# Patient Record
Sex: FEMALE
[De-identification: names, ages, dates, MRNs, and addresses within clinical notes are randomized per-mention and may not be internally consistent; named-entity substitution may affect disease eponyms.]

---

## 2017-01-11 ENCOUNTER — RX RENEWAL (OUTPATIENT)
Age: 32
End: 2017-01-11

## 2017-01-11 DIAGNOSIS — Z87.09 PERSONAL HISTORY OF OTHER DISEASES OF THE RESPIRATORY SYSTEM: ICD-10-CM

## 2017-01-11 RX ORDER — AMOXICILLIN AND CLAVULANATE POTASSIUM 875; 125 MG/1; MG/1
875-125 TABLET, COATED ORAL TWICE DAILY
Qty: 20 | Refills: 0 | Status: DISCONTINUED | COMMUNITY
Start: 2017-01-11 | End: 2017-01-11

## 2017-08-15 ENCOUNTER — INPATIENT (INPATIENT)
Facility: HOSPITAL | Age: 32
LOS: 2 days | Discharge: HOME | End: 2017-08-18
Attending: OBSTETRICS & GYNECOLOGY | Admitting: OBSTETRICS & GYNECOLOGY

## 2017-08-15 VITALS — HEIGHT: 63 IN | WEIGHT: 257.94 LBS

## 2017-08-15 DIAGNOSIS — O26.899 OTHER SPECIFIED PREGNANCY RELATED CONDITIONS, UNSPECIFIED TRIMESTER: ICD-10-CM

## 2017-08-15 DIAGNOSIS — Z3A.00 WEEKS OF GESTATION OF PREGNANCY NOT SPECIFIED: ICD-10-CM

## 2017-08-15 LAB
BASOPHILS # BLD AUTO: 0.03 K/UL — SIGNIFICANT CHANGE UP (ref 0–0.2)
BASOPHILS NFR BLD AUTO: 0.3 % — SIGNIFICANT CHANGE UP (ref 0–2)
BLD GP AB SCN SERPL QL: NEGATIVE — SIGNIFICANT CHANGE UP
EOSINOPHIL # BLD AUTO: 0.07 K/UL — SIGNIFICANT CHANGE UP (ref 0–0.5)
EOSINOPHIL NFR BLD AUTO: 0.6 % — SIGNIFICANT CHANGE UP (ref 0–6)
HCT VFR BLD CALC: 35.2 % — SIGNIFICANT CHANGE UP (ref 34.5–45)
HGB BLD-MCNC: 11.3 G/DL — LOW (ref 11.5–15.5)
IMM GRANULOCYTES # BLD AUTO: 0.05 # — SIGNIFICANT CHANGE UP
IMM GRANULOCYTES NFR BLD AUTO: 0.4 % — SIGNIFICANT CHANGE UP (ref 0–1.5)
LYMPHOCYTES # BLD AUTO: 28.3 % — SIGNIFICANT CHANGE UP (ref 13–44)
LYMPHOCYTES # BLD AUTO: 3.38 K/UL — HIGH (ref 1–3.3)
MCHC RBC-ENTMCNC: 28.3 PG — SIGNIFICANT CHANGE UP (ref 27–34)
MCHC RBC-ENTMCNC: 32.1 % — SIGNIFICANT CHANGE UP (ref 32–36)
MCV RBC AUTO: 88.2 FL — SIGNIFICANT CHANGE UP (ref 80–100)
MONOCYTES # BLD AUTO: 0.74 K/UL — SIGNIFICANT CHANGE UP (ref 0–0.9)
MONOCYTES NFR BLD AUTO: 6.2 % — SIGNIFICANT CHANGE UP (ref 2–14)
NEUTROPHILS # BLD AUTO: 7.69 K/UL — HIGH (ref 1.8–7.4)
NEUTROPHILS NFR BLD AUTO: 64.2 % — SIGNIFICANT CHANGE UP (ref 43–77)
NRBC # FLD: 0 — SIGNIFICANT CHANGE UP
PLATELET # BLD AUTO: 185 K/UL — SIGNIFICANT CHANGE UP (ref 150–400)
PMV BLD: 11.9 FL — SIGNIFICANT CHANGE UP (ref 7–13)
RBC # BLD: 3.99 M/UL — SIGNIFICANT CHANGE UP (ref 3.8–5.2)
RBC # FLD: 14.2 % — SIGNIFICANT CHANGE UP (ref 10.3–14.5)
RH IG SCN BLD-IMP: POSITIVE — SIGNIFICANT CHANGE UP
RH IG SCN BLD-IMP: POSITIVE — SIGNIFICANT CHANGE UP
WBC # BLD: 11.96 K/UL — HIGH (ref 3.8–10.5)
WBC # FLD AUTO: 11.96 K/UL — HIGH (ref 3.8–10.5)

## 2017-08-15 RX ORDER — OXYTOCIN 10 UNIT/ML
333.33 VIAL (ML) INJECTION
Qty: 20 | Refills: 0 | Status: DISCONTINUED | OUTPATIENT
Start: 2017-08-15 | End: 2017-08-15

## 2017-08-15 RX ORDER — SODIUM CHLORIDE 9 MG/ML
500 INJECTION, SOLUTION INTRAVENOUS ONCE
Refills: 0 | Status: DISCONTINUED | OUTPATIENT
Start: 2017-08-15 | End: 2017-08-15

## 2017-08-15 RX ORDER — SODIUM CHLORIDE 9 MG/ML
1000 INJECTION, SOLUTION INTRAVENOUS
Refills: 0 | Status: DISCONTINUED | OUTPATIENT
Start: 2017-08-15 | End: 2017-08-15

## 2017-08-15 RX ORDER — ACETAMINOPHEN 500 MG
325 TABLET ORAL ONCE
Refills: 0 | Status: COMPLETED | OUTPATIENT
Start: 2017-08-15 | End: 2017-08-15

## 2017-08-15 RX ORDER — SODIUM CHLORIDE 9 MG/ML
500 INJECTION, SOLUTION INTRAVENOUS ONCE
Refills: 0 | Status: DISCONTINUED | OUTPATIENT
Start: 2017-08-15 | End: 2017-08-16

## 2017-08-15 RX ORDER — OXYTOCIN 10 UNIT/ML
333.33 VIAL (ML) INJECTION
Qty: 20 | Refills: 0 | Status: DISCONTINUED | OUTPATIENT
Start: 2017-08-15 | End: 2017-08-16

## 2017-08-15 RX ORDER — CITRIC ACID/SODIUM CITRATE 300-500 MG
15 SOLUTION, ORAL ORAL EVERY 4 HOURS
Refills: 0 | Status: DISCONTINUED | OUTPATIENT
Start: 2017-08-15 | End: 2017-08-16

## 2017-08-15 RX ORDER — CITRIC ACID/SODIUM CITRATE 300-500 MG
15 SOLUTION, ORAL ORAL EVERY 4 HOURS
Refills: 0 | Status: DISCONTINUED | OUTPATIENT
Start: 2017-08-15 | End: 2017-08-15

## 2017-08-15 RX ORDER — SODIUM CHLORIDE 9 MG/ML
1000 INJECTION, SOLUTION INTRAVENOUS
Refills: 0 | Status: DISCONTINUED | OUTPATIENT
Start: 2017-08-15 | End: 2017-08-16

## 2017-08-15 RX ORDER — CITRIC ACID/SODIUM CITRATE 300-500 MG
30 SOLUTION, ORAL ORAL ONCE
Refills: 0 | Status: COMPLETED | OUTPATIENT
Start: 2017-08-15 | End: 2017-08-15

## 2017-08-15 RX ADMIN — Medication 30 MILLILITER(S): at 20:31

## 2017-08-15 RX ADMIN — Medication 325 MILLIGRAM(S): at 20:48

## 2017-08-15 RX ADMIN — SODIUM CHLORIDE 125 MILLILITER(S): 9 INJECTION, SOLUTION INTRAVENOUS at 20:33

## 2017-08-16 ENCOUNTER — TRANSCRIPTION ENCOUNTER (OUTPATIENT)
Age: 32
End: 2017-08-16

## 2017-08-16 LAB — T PALLIDUM AB TITR SER: NEGATIVE — SIGNIFICANT CHANGE UP

## 2017-08-16 RX ORDER — HYDROCORTISONE 1 %
1 OINTMENT (GRAM) TOPICAL EVERY 4 HOURS
Refills: 0 | Status: DISCONTINUED | OUTPATIENT
Start: 2017-08-16 | End: 2017-08-16

## 2017-08-16 RX ORDER — KETOROLAC TROMETHAMINE 30 MG/ML
30 SYRINGE (ML) INJECTION ONCE
Refills: 0 | Status: DISCONTINUED | OUTPATIENT
Start: 2017-08-16 | End: 2017-08-16

## 2017-08-16 RX ORDER — DIPHENHYDRAMINE HCL 50 MG
25 CAPSULE ORAL EVERY 6 HOURS
Refills: 0 | Status: DISCONTINUED | OUTPATIENT
Start: 2017-08-16 | End: 2017-08-16

## 2017-08-16 RX ORDER — SIMETHICONE 80 MG/1
80 TABLET, CHEWABLE ORAL EVERY 6 HOURS
Refills: 0 | Status: DISCONTINUED | OUTPATIENT
Start: 2017-08-16 | End: 2017-08-18

## 2017-08-16 RX ORDER — DIPHENHYDRAMINE HCL 50 MG
25 CAPSULE ORAL EVERY 6 HOURS
Refills: 0 | Status: DISCONTINUED | OUTPATIENT
Start: 2017-08-16 | End: 2017-08-18

## 2017-08-16 RX ORDER — ACETAMINOPHEN 500 MG
975 TABLET ORAL EVERY 6 HOURS
Refills: 0 | Status: DISCONTINUED | OUTPATIENT
Start: 2017-08-16 | End: 2017-08-18

## 2017-08-16 RX ORDER — MAGNESIUM HYDROXIDE 400 MG/1
30 TABLET, CHEWABLE ORAL
Refills: 0 | Status: DISCONTINUED | OUTPATIENT
Start: 2017-08-16 | End: 2017-08-18

## 2017-08-16 RX ORDER — SODIUM CHLORIDE 9 MG/ML
3 INJECTION INTRAMUSCULAR; INTRAVENOUS; SUBCUTANEOUS EVERY 8 HOURS
Refills: 0 | Status: DISCONTINUED | OUTPATIENT
Start: 2017-08-16 | End: 2017-08-18

## 2017-08-16 RX ORDER — SODIUM CHLORIDE 9 MG/ML
3 INJECTION INTRAMUSCULAR; INTRAVENOUS; SUBCUTANEOUS EVERY 8 HOURS
Refills: 0 | Status: DISCONTINUED | OUTPATIENT
Start: 2017-08-16 | End: 2017-08-16

## 2017-08-16 RX ORDER — DOCUSATE SODIUM 100 MG
100 CAPSULE ORAL
Refills: 0 | Status: DISCONTINUED | OUTPATIENT
Start: 2017-08-16 | End: 2017-08-16

## 2017-08-16 RX ORDER — OXYCODONE HYDROCHLORIDE 5 MG/1
5 TABLET ORAL
Refills: 0 | Status: DISCONTINUED | OUTPATIENT
Start: 2017-08-16 | End: 2017-08-18

## 2017-08-16 RX ORDER — PRAMOXINE HYDROCHLORIDE 150 MG/15G
1 AEROSOL, FOAM RECTAL EVERY 4 HOURS
Refills: 0 | Status: DISCONTINUED | OUTPATIENT
Start: 2017-08-16 | End: 2017-08-16

## 2017-08-16 RX ORDER — ACETAMINOPHEN 500 MG
975 TABLET ORAL EVERY 6 HOURS
Refills: 0 | Status: COMPLETED | OUTPATIENT
Start: 2017-08-16 | End: 2018-07-15

## 2017-08-16 RX ORDER — GLYCERIN ADULT
1 SUPPOSITORY, RECTAL RECTAL AT BEDTIME
Refills: 0 | Status: DISCONTINUED | OUTPATIENT
Start: 2017-08-16 | End: 2017-08-16

## 2017-08-16 RX ORDER — OXYTOCIN 10 UNIT/ML
2 VIAL (ML) INJECTION
Qty: 30 | Refills: 0 | Status: DISCONTINUED | OUTPATIENT
Start: 2017-08-16 | End: 2017-08-16

## 2017-08-16 RX ORDER — SODIUM CHLORIDE 9 MG/ML
1000 INJECTION, SOLUTION INTRAVENOUS
Refills: 0 | Status: DISCONTINUED | OUTPATIENT
Start: 2017-08-16 | End: 2017-08-16

## 2017-08-16 RX ORDER — TETANUS TOXOID, REDUCED DIPHTHERIA TOXOID AND ACELLULAR PERTUSSIS VACCINE, ADSORBED 5; 2.5; 8; 8; 2.5 [IU]/.5ML; [IU]/.5ML; UG/.5ML; UG/.5ML; UG/.5ML
0.5 SUSPENSION INTRAMUSCULAR ONCE
Refills: 0 | Status: DISCONTINUED | OUTPATIENT
Start: 2017-08-16 | End: 2017-08-18

## 2017-08-16 RX ORDER — OXYTOCIN 10 UNIT/ML
333.33 VIAL (ML) INJECTION
Qty: 20 | Refills: 0 | Status: COMPLETED | OUTPATIENT
Start: 2017-08-16 | End: 2017-08-16

## 2017-08-16 RX ORDER — PRAMOXINE HYDROCHLORIDE 150 MG/15G
1 AEROSOL, FOAM RECTAL EVERY 4 HOURS
Refills: 0 | Status: DISCONTINUED | OUTPATIENT
Start: 2017-08-16 | End: 2017-08-18

## 2017-08-16 RX ORDER — HYDROCORTISONE 1 %
1 OINTMENT (GRAM) TOPICAL EVERY 4 HOURS
Refills: 0 | Status: DISCONTINUED | OUTPATIENT
Start: 2017-08-16 | End: 2017-08-18

## 2017-08-16 RX ORDER — IBUPROFEN 200 MG
600 TABLET ORAL EVERY 6 HOURS
Refills: 0 | Status: DISCONTINUED | OUTPATIENT
Start: 2017-08-16 | End: 2017-08-18

## 2017-08-16 RX ORDER — CITRIC ACID/SODIUM CITRATE 300-500 MG
15 SOLUTION, ORAL ORAL EVERY 4 HOURS
Refills: 0 | Status: DISCONTINUED | OUTPATIENT
Start: 2017-08-16 | End: 2017-08-16

## 2017-08-16 RX ORDER — DOCUSATE SODIUM 100 MG
100 CAPSULE ORAL
Refills: 0 | Status: DISCONTINUED | OUTPATIENT
Start: 2017-08-16 | End: 2017-08-18

## 2017-08-16 RX ORDER — OXYCODONE HYDROCHLORIDE 5 MG/1
5 TABLET ORAL EVERY 4 HOURS
Refills: 0 | Status: DISCONTINUED | OUTPATIENT
Start: 2017-08-16 | End: 2017-08-18

## 2017-08-16 RX ORDER — LANOLIN
1 OINTMENT (GRAM) TOPICAL EVERY 6 HOURS
Refills: 0 | Status: DISCONTINUED | OUTPATIENT
Start: 2017-08-16 | End: 2017-08-18

## 2017-08-16 RX ORDER — IBUPROFEN 200 MG
600 TABLET ORAL EVERY 6 HOURS
Refills: 0 | Status: COMPLETED | OUTPATIENT
Start: 2017-08-16 | End: 2018-07-15

## 2017-08-16 RX ORDER — DIBUCAINE 1 %
1 OINTMENT (GRAM) RECTAL EVERY 4 HOURS
Refills: 0 | Status: DISCONTINUED | OUTPATIENT
Start: 2017-08-16 | End: 2017-08-18

## 2017-08-16 RX ORDER — OXYTOCIN 10 UNIT/ML
41.67 VIAL (ML) INJECTION
Qty: 20 | Refills: 0 | Status: DISCONTINUED | OUTPATIENT
Start: 2017-08-16 | End: 2017-08-16

## 2017-08-16 RX ORDER — AER TRAVELER 0.5 G/1
1 SOLUTION RECTAL; TOPICAL EVERY 4 HOURS
Refills: 0 | Status: DISCONTINUED | OUTPATIENT
Start: 2017-08-16 | End: 2017-08-16

## 2017-08-16 RX ORDER — SIMETHICONE 80 MG/1
80 TABLET, CHEWABLE ORAL EVERY 6 HOURS
Refills: 0 | Status: DISCONTINUED | OUTPATIENT
Start: 2017-08-16 | End: 2017-08-16

## 2017-08-16 RX ORDER — SODIUM CHLORIDE 9 MG/ML
500 INJECTION, SOLUTION INTRAVENOUS ONCE
Refills: 0 | Status: DISCONTINUED | OUTPATIENT
Start: 2017-08-16 | End: 2017-08-16

## 2017-08-16 RX ORDER — DIBUCAINE 1 %
1 OINTMENT (GRAM) RECTAL EVERY 4 HOURS
Refills: 0 | Status: DISCONTINUED | OUTPATIENT
Start: 2017-08-16 | End: 2017-08-16

## 2017-08-16 RX ORDER — OXYTOCIN 10 UNIT/ML
333.33 VIAL (ML) INJECTION
Qty: 20 | Refills: 0 | Status: COMPLETED | OUTPATIENT
Start: 2017-08-16

## 2017-08-16 RX ORDER — GLYCERIN ADULT
1 SUPPOSITORY, RECTAL RECTAL AT BEDTIME
Refills: 0 | Status: DISCONTINUED | OUTPATIENT
Start: 2017-08-16 | End: 2017-08-18

## 2017-08-16 RX ORDER — LANOLIN
1 OINTMENT (GRAM) TOPICAL EVERY 6 HOURS
Refills: 0 | Status: DISCONTINUED | OUTPATIENT
Start: 2017-08-16 | End: 2017-08-16

## 2017-08-16 RX ORDER — MAGNESIUM HYDROXIDE 400 MG/1
30 TABLET, CHEWABLE ORAL
Refills: 0 | Status: DISCONTINUED | OUTPATIENT
Start: 2017-08-16 | End: 2017-08-16

## 2017-08-16 RX ORDER — AER TRAVELER 0.5 G/1
1 SOLUTION RECTAL; TOPICAL EVERY 4 HOURS
Refills: 0 | Status: DISCONTINUED | OUTPATIENT
Start: 2017-08-16 | End: 2017-08-18

## 2017-08-16 RX ORDER — TETANUS TOXOID, REDUCED DIPHTHERIA TOXOID AND ACELLULAR PERTUSSIS VACCINE, ADSORBED 5; 2.5; 8; 8; 2.5 [IU]/.5ML; [IU]/.5ML; UG/.5ML; UG/.5ML; UG/.5ML
0.5 SUSPENSION INTRAMUSCULAR ONCE
Refills: 0 | Status: DISCONTINUED | OUTPATIENT
Start: 2017-08-16 | End: 2017-08-16

## 2017-08-16 RX ADMIN — Medication 30 MILLIGRAM(S): at 14:49

## 2017-08-16 RX ADMIN — Medication 975 MILLIGRAM(S): at 23:30

## 2017-08-16 RX ADMIN — Medication 600 MILLIGRAM(S): at 22:52

## 2017-08-16 RX ADMIN — Medication 1000 MILLIUNIT(S)/MIN: at 13:32

## 2017-08-16 RX ADMIN — Medication 975 MILLIGRAM(S): at 16:30

## 2017-08-16 RX ADMIN — Medication 2 MILLIUNIT(S)/MIN: at 08:44

## 2017-08-16 RX ADMIN — Medication 975 MILLIGRAM(S): at 17:15

## 2017-08-16 RX ADMIN — Medication 975 MILLIGRAM(S): at 22:53

## 2017-08-16 RX ADMIN — Medication 30 MILLIGRAM(S): at 13:45

## 2017-08-16 RX ADMIN — OXYCODONE HYDROCHLORIDE 5 MILLIGRAM(S): 5 TABLET ORAL at 22:52

## 2017-08-16 NOTE — DISCHARGE NOTE OB - CARE PROVIDER_API CALL
Gilberto Mercedes), Obstetrics and Gynecology  [address] [place] [address]  Phone: [phone]  Fax: [phone]

## 2017-08-16 NOTE — DISCHARGE NOTE OB - PATIENT PORTAL LINK FT
“You can access the [facility] Patient Portal, offered by [facility], by registering with the following website: [email or web address]”

## 2017-08-16 NOTE — DISCHARGE NOTE OB - MEDICATION SUMMARY - MEDICATIONS TO TAKE
I will START or STAY ON the medications listed below when I get home from the hospital:  None I will START or STAY ON the medications listed below when I get home from the hospital:    ibuprofen 600 mg oral tablet  -- 1 tab(s) by mouth every 6 hours, As Needed  -- Indication: For Pain    acetaminophen 325 mg oral tablet  -- 3 tab(s) by mouth every 6 hours, As Needed  -- Indication: For Pain

## 2017-08-17 DIAGNOSIS — O26.899 OTHER SPECIFIED PREGNANCY RELATED CONDITIONS, UNSPECIFIED TRIMESTER: ICD-10-CM

## 2017-08-17 DIAGNOSIS — O48.0 POST-TERM PREGNANCY: ICD-10-CM

## 2017-08-17 RX ORDER — ACETAMINOPHEN 500 MG
3 TABLET ORAL
Qty: 0 | Refills: 0 | DISCHARGE
Start: 2017-08-17

## 2017-08-17 RX ORDER — IBUPROFEN 200 MG
1 TABLET ORAL
Qty: 0 | Refills: 0 | DISCHARGE
Start: 2017-08-17

## 2017-08-17 RX ADMIN — OXYCODONE HYDROCHLORIDE 5 MILLIGRAM(S): 5 TABLET ORAL at 11:30

## 2017-08-17 RX ADMIN — OXYCODONE HYDROCHLORIDE 5 MILLIGRAM(S): 5 TABLET ORAL at 05:00

## 2017-08-17 RX ADMIN — Medication 975 MILLIGRAM(S): at 10:42

## 2017-08-17 RX ADMIN — OXYCODONE HYDROCHLORIDE 5 MILLIGRAM(S): 5 TABLET ORAL at 10:43

## 2017-08-17 RX ADMIN — OXYCODONE HYDROCHLORIDE 5 MILLIGRAM(S): 5 TABLET ORAL at 19:30

## 2017-08-17 RX ADMIN — Medication 975 MILLIGRAM(S): at 07:00

## 2017-08-17 RX ADMIN — Medication 100 MILLIGRAM(S): at 18:35

## 2017-08-17 RX ADMIN — Medication 600 MILLIGRAM(S): at 17:13

## 2017-08-17 RX ADMIN — PRAMOXINE HYDROCHLORIDE 1 APPLICATION(S): 150 AEROSOL, FOAM RECTAL at 20:48

## 2017-08-17 RX ADMIN — Medication 1 TABLET(S): at 10:43

## 2017-08-17 RX ADMIN — OXYCODONE HYDROCHLORIDE 5 MILLIGRAM(S): 5 TABLET ORAL at 12:06

## 2017-08-17 RX ADMIN — OXYCODONE HYDROCHLORIDE 5 MILLIGRAM(S): 5 TABLET ORAL at 18:00

## 2017-08-17 RX ADMIN — Medication 1 APPLICATION(S): at 20:48

## 2017-08-17 RX ADMIN — Medication 600 MILLIGRAM(S): at 10:42

## 2017-08-17 RX ADMIN — Medication 600 MILLIGRAM(S): at 11:30

## 2017-08-17 RX ADMIN — OXYCODONE HYDROCHLORIDE 5 MILLIGRAM(S): 5 TABLET ORAL at 23:13

## 2017-08-17 RX ADMIN — OXYCODONE HYDROCHLORIDE 5 MILLIGRAM(S): 5 TABLET ORAL at 17:14

## 2017-08-17 RX ADMIN — OXYCODONE HYDROCHLORIDE 5 MILLIGRAM(S): 5 TABLET ORAL at 13:00

## 2017-08-17 RX ADMIN — Medication 600 MILLIGRAM(S): at 18:00

## 2017-08-17 RX ADMIN — Medication 975 MILLIGRAM(S): at 11:30

## 2017-08-17 RX ADMIN — Medication 975 MILLIGRAM(S): at 05:00

## 2017-08-17 RX ADMIN — Medication 600 MILLIGRAM(S): at 07:00

## 2017-08-17 RX ADMIN — Medication 975 MILLIGRAM(S): at 23:12

## 2017-08-17 RX ADMIN — AER TRAVELER 1 APPLICATION(S): 0.5 SOLUTION RECTAL; TOPICAL at 20:48

## 2017-08-17 RX ADMIN — Medication 600 MILLIGRAM(S): at 23:12

## 2017-08-17 RX ADMIN — OXYCODONE HYDROCHLORIDE 5 MILLIGRAM(S): 5 TABLET ORAL at 07:00

## 2017-08-17 RX ADMIN — Medication 600 MILLIGRAM(S): at 05:00

## 2017-08-17 RX ADMIN — Medication 975 MILLIGRAM(S): at 18:00

## 2017-08-17 RX ADMIN — OXYCODONE HYDROCHLORIDE 5 MILLIGRAM(S): 5 TABLET ORAL at 18:35

## 2017-08-17 RX ADMIN — Medication 975 MILLIGRAM(S): at 17:13

## 2017-08-17 NOTE — PROGRESS NOTE ADULT - SUBJECTIVE AND OBJECTIVE BOX
Anesthesia Post-op Note    POD#1 S/P     Patient is doing well.  OOBAA.  Pain is tolerable.  No complaints of Headache. Pt reports able to void.  No residual anesthetic issues or complications noted.    T(C): 36.8 (17 @ 17:50), Max: 36.8 (17 @ 22:22)  HR: 96 (17 @ 17:50) (85 - 98)  BP: 129/61 (17 @ 17:50) (128/66 - 133/55)  RR: 17 (17 @ 17:50) (17 - 18)  SpO2: 100% (17 @ 17:50) (98% - 100%)

## 2017-08-17 NOTE — PROGRESS NOTE ADULT - SUBJECTIVE AND OBJECTIVE BOX
S: Patient pain in tail bone and at epidural site    Pain controlled.  +OOB.  +void.    Tolerating PO.  Lochia WNL.    O: Vital Signs Last 24 Hrs  T(C): 36.7 (17 Aug 2017 06:00), Max: 37.4 (16 Aug 2017 12:59)  T(F): 98 (17 Aug 2017 06:00), Max: 99.3 (16 Aug 2017 12:59)  HR: 98 (17 Aug 2017 06:00) (68 - 116)  BP: 129/67 (17 Aug 2017 06:00) (123/69 - 147/73)  BP(mean): --  RR: 18 (17 Aug 2017 06:00) (16 - 18)  SpO2: 98% (17 Aug 2017 06:00) (98% - 99%)      Pt without complaints  vital signs stable  Abdomen soft  fundus firm, non tender  extremities non tender  lochia wnl    Patient doing well  Routine post partum care    Labs:                        11.3   11.96 )-----------( 185      ( 15 Aug 2017 20:10 )             35.2       ABO Interpretation: O (08-15 @ 20:33)    Rh Interpretation: Positive (08-15 @ 20:33)    Antibody Screen Negative      A: 32y s/p  doing well.   -Continue  postpartum care.  to have anesthesia see patient  consider physical therapy consult  -Discharge planned for tomorrow

## 2017-08-17 NOTE — PROGRESS NOTE ADULT - SUBJECTIVE AND OBJECTIVE BOX
Patient assessed at 0745.  Subjective  Pain: Patient reports pain in coccyx area that has slightly relieved by pain medication protocol but still painful.  Complaints: pain in coccyx area since delivery.   Milestones: Alert and orientedx3. Out of bed ambulating. Voiding/Due to void. Tolerating a regular diet.  Infant feeding: Formula feeding. Patient instructed to wear tight bra and ice to breast 2-3times per day.                               Feeding related issues or concerns: None    Objective   Vital Signs:  Vital Signs Last 24 Hrs  T(C): 36.7 (17 Aug 2017 06:00), Max: 37.4 (16 Aug 2017 12:59)  T(F): 98 (17 Aug 2017 06:00), Max: 99.3 (16 Aug 2017 12:59)  HR: 98 (17 Aug 2017 06:00) (68 - 116)  BP: 129/67 (17 Aug 2017 06:00) (123/69 - 147/73)  BP(mean): --  RR: 18 (17 Aug 2017 06:00) (16 - 18)  SpO2: 98% (17 Aug 2017 06:00) (98% - 99%)    Labs:                        11.3   11.96 )-----------( 185      ( 15 Aug 2017 20:10 )             35.2   Blood Type:Opositive  Rubella: Immune  RPR: nonreactive    Assessment  31y/o . Day #1  postpartum .  Past medical history significant for traumatic brain injury 15 year ago and headaches associated due to this injury. Patient denies any headache, blur vision and/or dizziness at the time of assessment.   Current Issues: coccyx pain. Patient denies any groin pain. Patient reports ability to ambulate slowly and weight bare on legs.   Breasts: soft, nontender  Nipples: intact  Abdomen: Soft, nontender, nondistended. Fundus firm. Positive bowel sounds  Vagina: Lochia moderate rubra  Perineum: Patient unable to side lie to fully assess perineum. Perineum assessed in supine position and 2 degree laceration WNL. No edema noted  Laceration/episiotomy site: 2nd degree laceration.  Lower extremities: No edema noted bilaterally of lower extremities. Nontender calves.  Negative Mesha's sign. Positive pedal pulses.  Other relevant physical exam findings;      Plan  Plan: Increase ambulation, analgesia PRN and pain medication protocol standing oxycodone, ibuprofen and acetaminophen.  Diet: Continue regular diet  Labs: None  Patient instructed to continue taking pain medications as ordered, abdominal binder and hot pack to coccyx to assist with coccyx pain. Will continue to monitor.     Continue routine postpartum care. Patient assessed at 0745.  Subjective  Pain: Patient reports pain in sacrum/coccyx area that has slightly relieved by pain medication protocol but still painful.  Complaints: pain in coccyx area since delivery.   Milestones: Alert and orientedx3. Out of bed ambulating. Voiding/Due to void. Tolerating a regular diet.  Infant feeding: Formula feeding. Patient instructed to wear tight bra and ice to breast 2-3times per day.                               Feeding related issues or concerns: None    Objective   Vital Signs:  Vital Signs Last 24 Hrs  T(C): 36.7 (17 Aug 2017 06:00), Max: 37.4 (16 Aug 2017 12:59)  T(F): 98 (17 Aug 2017 06:00), Max: 99.3 (16 Aug 2017 12:59)  HR: 98 (17 Aug 2017 06:00) (68 - 116)  BP: 129/67 (17 Aug 2017 06:00) (123/69 - 147/73)  BP(mean): --  RR: 18 (17 Aug 2017 06:00) (16 - 18)  SpO2: 98% (17 Aug 2017 06:00) (98% - 99%)    Labs:                        11.3   11.96 )-----------( 185      ( 15 Aug 2017 20:10 )             35.2   Blood Type:Opositive  Rubella: Immune  RPR: nonreactive    Assessment  31y/o . Day #1  postpartum .  Past medical history significant for traumatic brain injury 15 year ago and headaches associated due to this injury. Patient denies any headache, blur vision and/or dizziness at the time of assessment.   Current Issues: coccyx pain. Patient denies any groin pain. Patient reports ability to ambulate slowly and weight bare on legs.   Breasts: soft, nontender  Nipples: intact  Abdomen: Soft, nontender, nondistended. Fundus firm. Positive bowel sounds  Vagina: Lochia moderate rubra  Perineum: Patient unable to side lie to fully assess perineum. Perineum assessed in supine position and 2 degree laceration WNL. No edema noted  Laceration/episiotomy site: 2nd degree laceration.  Lower extremities: No edema noted bilaterally of lower extremities. Nontender calves.  Negative Mesha's sign. Positive pedal pulses.  Other relevant physical exam findings;      Plan  Plan: Increase ambulation, analgesia PRN and pain medication protocol standing oxycodone, ibuprofen and acetaminophen.  Diet: Continue regular diet  Labs: None  Patient instructed to continue taking pain medications as ordered, abdominal binder and hot pack to sacrum/coccyx to assist with coccyx pain. Will continue to monitor.     Continue routine postpartum care.

## 2017-08-18 VITALS
OXYGEN SATURATION: 99 % | RESPIRATION RATE: 18 BRPM | DIASTOLIC BLOOD PRESSURE: 75 MMHG | SYSTOLIC BLOOD PRESSURE: 137 MMHG | HEART RATE: 93 BPM | TEMPERATURE: 98 F

## 2017-08-18 RX ADMIN — OXYCODONE HYDROCHLORIDE 5 MILLIGRAM(S): 5 TABLET ORAL at 17:38

## 2017-08-18 RX ADMIN — OXYCODONE HYDROCHLORIDE 5 MILLIGRAM(S): 5 TABLET ORAL at 12:53

## 2017-08-18 RX ADMIN — Medication 600 MILLIGRAM(S): at 18:40

## 2017-08-18 RX ADMIN — Medication 975 MILLIGRAM(S): at 18:40

## 2017-08-18 RX ADMIN — Medication 600 MILLIGRAM(S): at 06:13

## 2017-08-18 RX ADMIN — Medication 975 MILLIGRAM(S): at 07:00

## 2017-08-18 RX ADMIN — Medication 975 MILLIGRAM(S): at 17:37

## 2017-08-18 RX ADMIN — OXYCODONE HYDROCHLORIDE 5 MILLIGRAM(S): 5 TABLET ORAL at 11:57

## 2017-08-18 RX ADMIN — OXYCODONE HYDROCHLORIDE 5 MILLIGRAM(S): 5 TABLET ORAL at 00:30

## 2017-08-18 RX ADMIN — Medication 600 MILLIGRAM(S): at 00:30

## 2017-08-18 RX ADMIN — OXYCODONE HYDROCHLORIDE 5 MILLIGRAM(S): 5 TABLET ORAL at 06:12

## 2017-08-18 RX ADMIN — Medication 100 MILLIGRAM(S): at 06:12

## 2017-08-18 RX ADMIN — OXYCODONE HYDROCHLORIDE 5 MILLIGRAM(S): 5 TABLET ORAL at 18:40

## 2017-08-18 RX ADMIN — Medication 975 MILLIGRAM(S): at 06:11

## 2017-08-18 RX ADMIN — Medication 600 MILLIGRAM(S): at 12:53

## 2017-08-18 RX ADMIN — Medication 975 MILLIGRAM(S): at 11:57

## 2017-08-18 RX ADMIN — Medication 975 MILLIGRAM(S): at 12:53

## 2017-08-18 RX ADMIN — Medication 600 MILLIGRAM(S): at 17:38

## 2017-08-18 RX ADMIN — OXYCODONE HYDROCHLORIDE 5 MILLIGRAM(S): 5 TABLET ORAL at 12:52

## 2017-08-18 RX ADMIN — Medication 600 MILLIGRAM(S): at 07:00

## 2017-08-18 RX ADMIN — Medication 600 MILLIGRAM(S): at 11:57

## 2017-08-18 RX ADMIN — Medication 975 MILLIGRAM(S): at 00:30

## 2017-08-18 RX ADMIN — OXYCODONE HYDROCHLORIDE 5 MILLIGRAM(S): 5 TABLET ORAL at 07:00

## 2017-08-18 NOTE — PROGRESS NOTE ADULT - SUBJECTIVE AND OBJECTIVE BOX
Patient assessed at 0720.  Subjective  Pain: Patient states pain of coccyx has improved with pain medication.  Complaints: Coccyx pain  Milestones: Alert and orientedx3. Out of bed ambulating. Voiding. Tolerating a regular diet.  Infant feeding: Formula feeding primarily. Patient attempt to use breast pump but didnt produce enough breastmilk with history of polyscystic ovarian syndrome and feels she wont produce enough breastmilk this time.                            Feeding related issues or concerns: None    Objective   Vital Signs:  Vital Signs Last 24 Hrs  T(C): 36.9 (18 Aug 2017 06:08), Max: 36.9 (18 Aug 2017 06:08)  T(F): 98.5 (18 Aug 2017 06:08), Max: 98.5 (18 Aug 2017 06:08)  HR: 93 (18 Aug 2017 06:08) (93 - 96)  BP: 137/75 (18 Aug 2017 06:08) (129/61 - 137/75)  BP(mean): --  RR: 18 (18 Aug 2017 06:08) (17 - 18)  SpO2: 99% (18 Aug 2017 06:08) (99% - 100%)    Labs:  08/15/17 @  WBC 11.96, RBC 3.99, Hgb 11.3, Hct 35.2, Plt 185  Blood Type: Opositive  Rubella: Immune  RPR: nonreactive    Assessment  33y/o . Day #2  postpartum .  Past medical history significant for traumatic brain injury 15 years ago and history of headache after this incidence. Patient denies any headache, blur vision and/or dizziness at the time of the assessment  Current Issues: Coccyx pain, physical therapy consult ordered. If unable to see patient today, patient to see physical therapy outpatient.    Breasts: Breast soft, nontender  Nipples: intact  Abdomen: Soft, nontender, nondistended. Fundus firm. Positive bowel sounds  Vagina: Lochia light rubra  Perineum: laceration WNL, no edema noted. Hemorrhoid present. Instructed to use witch hazel pads and topical. Patient verbalized an understanding.   Laceration/episiotomy site: 2nd degree laceration.  Lower extremities: Slight edema noted bilaterally of lower extremities. Nontender calves.  Negative Mesha's sign. Positive pedal pulses. Patient able to ambulate with no difficulties. Patient moves slowly due to coccyx discomfort.   Other relevant physical exam findings: None      Plan  Plan: Increase ambulation, analgesia PRN and pain medication protocol standing oxycodone, ibuprofen and acetaminophen.  Diet: Continue regular diet  Labs:None    Continue routine postpartum care. Patient to be discharge to home today. Discussed discharge planning.

## 2017-10-25 ENCOUNTER — APPOINTMENT (OUTPATIENT)
Dept: ORTHOPEDIC SURGERY | Facility: CLINIC | Age: 32
End: 2017-10-25
Payer: COMMERCIAL

## 2017-10-25 VITALS
BODY MASS INDEX: 40.75 KG/M2 | HEIGHT: 63 IN | SYSTOLIC BLOOD PRESSURE: 129 MMHG | DIASTOLIC BLOOD PRESSURE: 82 MMHG | HEART RATE: 90 BPM | WEIGHT: 230 LBS

## 2017-10-25 DIAGNOSIS — M54.5 LOW BACK PAIN: ICD-10-CM

## 2017-10-25 PROCEDURE — 99204 OFFICE O/P NEW MOD 45 MIN: CPT

## 2017-11-08 ENCOUNTER — APPOINTMENT (OUTPATIENT)
Dept: ORTHOPEDIC SURGERY | Facility: CLINIC | Age: 32
End: 2017-11-08

## 2017-12-28 ENCOUNTER — RESULT REVIEW (OUTPATIENT)
Age: 32
End: 2017-12-28

## 2018-07-31 NOTE — PATIENT PROFILE OB - BREAST MILK PROVIDES PROTECTION AGAINST INFECTION
Sammy recently discharge from hospital.  Discharge instructions state they're holding Plavix and Eliquis until follow-up with PCP. Please informed patient she will need to contact cardiology whom prescribes the Plavix in the Eliquis to see when she can resume these medications. Please inform patient.   Thank you
Statement Selected

## 2018-11-16 ENCOUNTER — APPOINTMENT (OUTPATIENT)
Dept: SURGERY | Facility: CLINIC | Age: 33
End: 2018-11-16
Payer: COMMERCIAL

## 2018-11-16 VITALS
HEART RATE: 89 BPM | WEIGHT: 240 LBS | SYSTOLIC BLOOD PRESSURE: 116 MMHG | BODY MASS INDEX: 44.16 KG/M2 | DIASTOLIC BLOOD PRESSURE: 84 MMHG | HEIGHT: 62 IN | OXYGEN SATURATION: 100 % | TEMPERATURE: 98.7 F | RESPIRATION RATE: 14 BRPM

## 2018-11-16 DIAGNOSIS — M17.10 UNILATERAL PRIMARY OSTEOARTHRITIS, UNSPECIFIED KNEE: ICD-10-CM

## 2018-11-16 DIAGNOSIS — Z82.61 FAMILY HISTORY OF ARTHRITIS: ICD-10-CM

## 2018-11-16 DIAGNOSIS — Z87.39 PERSONAL HISTORY OF OTHER DISEASES OF THE MUSCULOSKELETAL SYSTEM AND CONNECTIVE TISSUE: ICD-10-CM

## 2018-11-16 DIAGNOSIS — K80.20 CALCULUS OF GALLBLADDER W/OUT CHOLECYSTITIS W/OUT OBSTRUCTION: ICD-10-CM

## 2018-11-16 DIAGNOSIS — Z78.9 OTHER SPECIFIED HEALTH STATUS: ICD-10-CM

## 2018-11-16 PROCEDURE — 99205 OFFICE O/P NEW HI 60 MIN: CPT

## 2018-12-18 ENCOUNTER — APPOINTMENT (OUTPATIENT)
Dept: CARDIOLOGY | Facility: CLINIC | Age: 33
End: 2018-12-18

## 2019-01-14 LAB
25(OH)D3 SERPL-MCNC: 14.9 NG/ML
ALBUMIN SERPL ELPH-MCNC: 4.4 G/DL
ALP BLD-CCNC: 84 U/L
ALT SERPL-CCNC: 17 U/L
ANION GAP SERPL CALC-SCNC: 13 MMOL/L
APTT BLD: 31.1 SEC
AST SERPL-CCNC: 15 U/L
BASOPHILS # BLD AUTO: 0.03 K/UL
BASOPHILS NFR BLD AUTO: 0.4 %
BILIRUB SERPL-MCNC: 0.6 MG/DL
BUN SERPL-MCNC: 11 MG/DL
CALCIUM SERPL-MCNC: 9.7 MG/DL
CHLORIDE SERPL-SCNC: 103 MMOL/L
CO2 SERPL-SCNC: 26 MMOL/L
CREAT SERPL-MCNC: 0.68 MG/DL
EOSINOPHIL # BLD AUTO: 0.1 K/UL
EOSINOPHIL NFR BLD AUTO: 1.4 %
FOLATE SERPL-MCNC: 16.5 NG/ML
GLUCOSE SERPL-MCNC: 90 MG/DL
HBA1C MFR BLD HPLC: 5.4 %
HCG SERPL QL: NEGATIVE
HCT VFR BLD CALC: 41.4 %
HGB BLD-MCNC: 13.1 G/DL
IMM GRANULOCYTES NFR BLD AUTO: 0.1 %
INR PPP: 0.97 RATIO
IRON SERPL-MCNC: 45 UG/DL
LYMPHOCYTES # BLD AUTO: 3.11 K/UL
LYMPHOCYTES NFR BLD AUTO: 44.9 %
MAN DIFF?: NORMAL
MCHC RBC-ENTMCNC: 28.4 PG
MCHC RBC-ENTMCNC: 31.6 GM/DL
MCV RBC AUTO: 89.8 FL
MONOCYTES # BLD AUTO: 0.26 K/UL
MONOCYTES NFR BLD AUTO: 3.8 %
NEUTROPHILS # BLD AUTO: 3.42 K/UL
NEUTROPHILS NFR BLD AUTO: 49.4 %
PAPP-A SERPL-ACNC: <1 MIU/ML
PLATELET # BLD AUTO: 201 K/UL
POTASSIUM SERPL-SCNC: 4.3 MMOL/L
PROT SERPL-MCNC: 7.2 G/DL
PT BLD: 11.1 SEC
RBC # BLD: 4.61 M/UL
RBC # FLD: 13.9 %
SODIUM SERPL-SCNC: 142 MMOL/L
TSH SERPL-ACNC: 0.72 UIU/ML
VIT B12 SERPL-MCNC: 634 PG/ML
WBC # FLD AUTO: 6.93 K/UL

## 2019-01-16 LAB — VIT B1 SERPL-MCNC: 95.3 NMOL/L

## 2019-02-05 ENCOUNTER — RESULT REVIEW (OUTPATIENT)
Age: 34
End: 2019-02-05

## 2019-04-09 ENCOUNTER — APPOINTMENT (OUTPATIENT)
Dept: SURGERY | Facility: CLINIC | Age: 34
End: 2019-04-09
Payer: COMMERCIAL

## 2019-04-09 PROCEDURE — 99215 OFFICE O/P EST HI 40 MIN: CPT

## 2019-04-10 ENCOUNTER — OUTPATIENT (OUTPATIENT)
Dept: OUTPATIENT SERVICES | Facility: HOSPITAL | Age: 34
LOS: 1 days | End: 2019-04-10
Payer: COMMERCIAL

## 2019-04-10 VITALS
HEART RATE: 102 BPM | RESPIRATION RATE: 15 BRPM | TEMPERATURE: 100 F | HEIGHT: 62 IN | SYSTOLIC BLOOD PRESSURE: 121 MMHG | WEIGHT: 227.96 LBS | DIASTOLIC BLOOD PRESSURE: 83 MMHG | OXYGEN SATURATION: 97 %

## 2019-04-10 DIAGNOSIS — Z01.818 ENCOUNTER FOR OTHER PREPROCEDURAL EXAMINATION: ICD-10-CM

## 2019-04-10 DIAGNOSIS — K80.20 CALCULUS OF GALLBLADDER WITHOUT CHOLECYSTITIS WITHOUT OBSTRUCTION: ICD-10-CM

## 2019-04-10 DIAGNOSIS — E66.01 MORBID (SEVERE) OBESITY DUE TO EXCESS CALORIES: ICD-10-CM

## 2019-04-10 DIAGNOSIS — Z29.9 ENCOUNTER FOR PROPHYLACTIC MEASURES, UNSPECIFIED: ICD-10-CM

## 2019-04-10 DIAGNOSIS — E66.9 OBESITY, UNSPECIFIED: ICD-10-CM

## 2019-04-10 LAB
ALBUMIN SERPL ELPH-MCNC: 4.3 G/DL — SIGNIFICANT CHANGE UP (ref 3.3–5)
ALP SERPL-CCNC: 66 U/L — SIGNIFICANT CHANGE UP (ref 40–120)
ALT FLD-CCNC: 28 U/L — SIGNIFICANT CHANGE UP (ref 10–45)
ANION GAP SERPL CALC-SCNC: 16 MMOL/L — SIGNIFICANT CHANGE UP (ref 5–17)
AST SERPL-CCNC: 17 U/L — SIGNIFICANT CHANGE UP (ref 10–40)
BILIRUB SERPL-MCNC: 0.4 MG/DL — SIGNIFICANT CHANGE UP (ref 0.2–1.2)
BLD GP AB SCN SERPL QL: NEGATIVE — SIGNIFICANT CHANGE UP
BUN SERPL-MCNC: 10 MG/DL — SIGNIFICANT CHANGE UP (ref 7–23)
CALCIUM SERPL-MCNC: 9.9 MG/DL — SIGNIFICANT CHANGE UP (ref 8.4–10.5)
CHLORIDE SERPL-SCNC: 99 MMOL/L — SIGNIFICANT CHANGE UP (ref 96–108)
CO2 SERPL-SCNC: 21 MMOL/L — LOW (ref 22–31)
CREAT SERPL-MCNC: 0.57 MG/DL — SIGNIFICANT CHANGE UP (ref 0.5–1.3)
GLUCOSE SERPL-MCNC: 67 MG/DL — LOW (ref 70–99)
POTASSIUM SERPL-MCNC: 3.9 MMOL/L — SIGNIFICANT CHANGE UP (ref 3.5–5.3)
POTASSIUM SERPL-SCNC: 3.9 MMOL/L — SIGNIFICANT CHANGE UP (ref 3.5–5.3)
PROT SERPL-MCNC: 7.8 G/DL — SIGNIFICANT CHANGE UP (ref 6–8.3)
RH IG SCN BLD-IMP: POSITIVE — SIGNIFICANT CHANGE UP
SODIUM SERPL-SCNC: 136 MMOL/L — SIGNIFICANT CHANGE UP (ref 135–145)

## 2019-04-10 PROCEDURE — 80053 COMPREHEN METABOLIC PANEL: CPT

## 2019-04-10 PROCEDURE — 83036 HEMOGLOBIN GLYCOSYLATED A1C: CPT

## 2019-04-10 PROCEDURE — 86850 RBC ANTIBODY SCREEN: CPT

## 2019-04-10 PROCEDURE — 85027 COMPLETE CBC AUTOMATED: CPT

## 2019-04-10 PROCEDURE — 86901 BLOOD TYPING SEROLOGIC RH(D): CPT

## 2019-04-10 PROCEDURE — 86900 BLOOD TYPING SEROLOGIC ABO: CPT

## 2019-04-10 PROCEDURE — G0463: CPT

## 2019-04-10 RX ORDER — LIDOCAINE HCL 20 MG/ML
0.2 VIAL (ML) INJECTION ONCE
Refills: 0 | Status: DISCONTINUED | OUTPATIENT
Start: 2019-04-17 | End: 2019-04-17

## 2019-04-10 RX ORDER — CEFAZOLIN SODIUM 1 G
2000 VIAL (EA) INJECTION ONCE
Refills: 0 | Status: COMPLETED | OUTPATIENT
Start: 2019-04-17 | End: 2019-04-17

## 2019-04-10 RX ORDER — HEPARIN SODIUM 5000 [USP'U]/ML
5000 INJECTION INTRAVENOUS; SUBCUTANEOUS ONCE
Refills: 0 | Status: COMPLETED | OUTPATIENT
Start: 2019-04-17 | End: 2019-04-17

## 2019-04-10 RX ORDER — SODIUM CHLORIDE 9 MG/ML
3 INJECTION INTRAMUSCULAR; INTRAVENOUS; SUBCUTANEOUS EVERY 8 HOURS
Refills: 0 | Status: DISCONTINUED | OUTPATIENT
Start: 2019-04-17 | End: 2019-04-17

## 2019-04-10 RX ORDER — CHLORHEXIDINE GLUCONATE 213 G/1000ML
1 SOLUTION TOPICAL DAILY
Refills: 0 | Status: DISCONTINUED | OUTPATIENT
Start: 2019-04-17 | End: 2019-04-17

## 2019-04-10 NOTE — H&P PST ADULT - ASSESSMENT
CAPRINI SCORE [CLOT updated 18]    AGE RELATED RISK FACTORS                                                       MOBILITY RELATED FACTORS  [ ] Age 41-60 years                                            (1 Point)                    [ ] Bed rest                                                        (1 Point)  [ ] Age: 61-74 years                                           (2 Points)                  [ ] Plaster cast                                                   (2 Points)  [ ] Age= 75 years                                              (3 Points)                    [ ] Bed bound for more than 72 hours                 (2 Points)    DISEASE RELATED RISK FACTORS                                               GENDER SPECIFIC FACTORS  [ ] Edema in the lower extremities                       (1 Point)              [ ] Pregnancy                                                     (1 Point)  [ ] Varicose veins                                               (1 Point)                     [ ] Post-partum < 6 weeks                                   (1 Point)             [ ] BMI > 25 Kg/m2                                            (1 Point)                     [ ] Hormonal therapy  or oral contraception          (1 Point)                 [ ] Sepsis (in the previous month)                        (1 Point)               [ ] History of pregnancy complications                 (1 point)  [ ] Pneumonia or serious lung disease                                               [ ] Unexplained or recurrent                     (1 Point)           (in the previous month)                               (1 Point)  [ ] Abnormal pulmonary function test                     (1 Point)                 SURGERY RELATED RISK FACTORS  [ ] Acute myocardial infarction                              (1 Point)               [ ]  Section                                             (1 Point)  [ ] Congestive heart failure (in the previous month)  (1 Point)      [ ] Minor surgery                                                  (1 Point)   [ ] Inflammatory bowel disease                             (1 Point)               [ ] Arthroscopic surgery                                        (2 Points)  [ ] Central venous access                                      (2 Points)                [ ] General surgery lasting more than 45 minutes (2 points)  [ ] Present or previous malignancy                     (2 Points)                [ ] Elective arthroplasty                                         (5 points)    [ ] Stroke (in the previous month)                          (5 Points)                                                                                                                                                           HEMATOLOGY RELATED FACTORS                                                 TRAUMA RELATED RISK FACTORS  [ ] Prior episodes of VTE                                     (3 Points)                [ ] Fracture of the hip, pelvis, or leg                       (5 Points)  [ ] Positive family history for VTE                         (3 Points)             [ ] Acute spinal cord injury (in the previous month)  (5 Points)  [ ] Prothrombin 71736 A                                     (3 Points)               [ ] Paralysis  (less than 1 month)                             (5 Points)  [ ] Factor V Leiden                                             (3 Points)                  [ ] Multiple Trauma within 1 month                        (5 Points)  [ ] Lupus anticoagulants                                     (3 Points)                                                           [ ] Anticardiolipin antibodies                               (3 Points)                                                       [ ] High homocysteine in the blood                      (3 Points)                                             [ ] Other congenital or acquired thrombophilia      (3 Points)                                                [ ] Heparin induced thrombocytopenia                  (3 Points)                                     Total Score [  3        ]

## 2019-04-10 NOTE — H&P PST ADULT - NSICDXPROBLEM_GEN_ALL_CORE_FT
PROBLEM DIAGNOSES  Problem: Obesity  Assessment and Plan: Scheduled sleeve gastrectomy  incentive spirometer, chlorhexidine was reviewed with patient  preop heparin ordered   urine preg DOS  STAT BG on admission      Problem: Cholelithiasis  Assessment and Plan: CMP ordered with PST labs      Problem: Need for prophylactic measure  Assessment and Plan: The Caprini score indicates this patient is at risk for a VTE event (score 3-5).  Most surgical patients in this group would benefit from pharmacologic prophylaxis.  The surgical team will determine the balance between VTE risk and bleeding risk

## 2019-04-10 NOTE — H&P PST ADULT - NSANTHOSAYNRD_GEN_A_CORE
No. OSWALDO screening performed.  STOP BANG Legend: 0-2 = LOW Risk; 3-4 = INTERMEDIATE Risk; 5-8 = HIGH Risk

## 2019-04-10 NOTE — H&P PST ADULT - NSICDXPASTMEDICALHX_GEN_ALL_CORE_FT
PAST MEDICAL HISTORY:  Gallstones     Obesity     PCOS (polycystic ovarian syndrome) PAST MEDICAL HISTORY:  Gallstones diagnosed 2016    Hiatal hernia founs on endoscopy    Obesity     PCOS (polycystic ovarian syndrome) PAST MEDICAL HISTORY:  Gallstones diagnosed 2016    Hiatal hernia found on endoscopy (w/u for sleeve)    Lumbago     Multiple thyroid nodules large; biopsies have always been negative; TFTs are normal    Obesity     PCOS (polycystic ovarian syndrome)

## 2019-04-10 NOTE — H&P PST ADULT - NEGATIVE GASTROINTESTINAL SYMPTOMS
no nausea/no vomiting/no diarrhea/no constipation/no change in bowel habits/no melena/no hematochezia/no jaundice

## 2019-04-10 NOTE — H&P PST ADULT - HISTORY OF PRESENT ILLNESS
32 y/o female w/ pmhx of morbid obesity 34 y/o female w/ pmhx of morbid obesity, 34 y/o female w/ pmhx of morbid obesity, cholelithiases diagnosed in 2016 now reports experiencing frequent RUQ discomfort. Denies fever/chills, n/v, jaundice. Presents for gastric sleeve and lap ata.

## 2019-04-11 LAB
HBA1C BLD-MCNC: 5.3 % — SIGNIFICANT CHANGE UP (ref 4–5.6)
HCT VFR BLD CALC: 39.8 % — SIGNIFICANT CHANGE UP (ref 34.5–45)
HGB BLD-MCNC: 13.1 G/DL — SIGNIFICANT CHANGE UP (ref 11.5–15.5)
MCHC RBC-ENTMCNC: 28.3 PG — SIGNIFICANT CHANGE UP (ref 27–34)
MCHC RBC-ENTMCNC: 32.9 GM/DL — SIGNIFICANT CHANGE UP (ref 32–36)
MCV RBC AUTO: 86 FL — SIGNIFICANT CHANGE UP (ref 80–100)
PLATELET # BLD AUTO: 238 K/UL — SIGNIFICANT CHANGE UP (ref 150–400)
RBC # BLD: 4.63 M/UL — SIGNIFICANT CHANGE UP (ref 3.8–5.2)
RBC # FLD: 13 % — SIGNIFICANT CHANGE UP (ref 10.3–14.5)
WBC # BLD: 12.18 K/UL — HIGH (ref 3.8–10.5)
WBC # FLD AUTO: 12.18 K/UL — HIGH (ref 3.8–10.5)

## 2019-04-14 NOTE — PHARMACOTHERAPY INTERVENTION NOTE - COMMENTS
Vertical sleeve gastrectomy scheduled for XXXXX /2019.  Patient medication reconciliation completed. Patient currently taking:     multivitamin patch once daily  naproxen 200 mg one tablet by mouth PRN     Patient was instructed to use crushed, dissolvable, chewable, or liquid formulations of medications for 1 month. Patient was informed to take daily multivitamins post surgically. Patient reeducated on NSAID avoidance (ibuprofen, ASA, naproxen, aleve) as they increased risk of GI bleeding; may use APAP for mild pain otherwise contact prescriber for consult. Patient was informed on indications and directions for administration for hyoscyamine SL, acetaminophen liquid, ondansetron ODT, and omeprazole DRRosey Patient was instructed to take the medications as follows:    Continue vitamins/supplements in chewable/dissolvable forms  Discontinue naproxen    Claudia Mohr PharmD  PGY2 Pharmacotherapy Resident  Pager: [phone] Vertical sleeve gastrectomy scheduled for 04/17/2019.  Patient medication reconciliation completed. Patient currently taking:     multivitamin patch once daily  naproxen 200 mg one tablet by mouth PRN     Patient was instructed to use crushed, dissolvable, chewable, or liquid formulations of medications for 1 month. Patient was informed to take daily multivitamins post surgically. Patient reeducated on NSAID avoidance (ibuprofen, ASA, naproxen, aleve) as they increased risk of GI bleeding; may use APAP for mild pain otherwise contact prescriber for consult. Patient was informed on indications and directions for administration for hyoscyamine SL, acetaminophen liquid, ondansetron ODT, and omeprazole DRRosey Patient was instructed to take the medications as follows:    Continue vitamins/supplements in chewable/dissolvable forms  Discontinue naproxen    Rei BaileyD  PGY2 Pharmacotherapy Resident  Pager: [phone]

## 2019-04-16 ENCOUNTER — TRANSCRIPTION ENCOUNTER (OUTPATIENT)
Age: 34
End: 2019-04-16

## 2019-04-16 VITALS
RESPIRATION RATE: 18 BRPM | HEART RATE: 95 BPM | DIASTOLIC BLOOD PRESSURE: 82 MMHG | HEIGHT: 62 IN | TEMPERATURE: 99 F | WEIGHT: 227.96 LBS | SYSTOLIC BLOOD PRESSURE: 114 MMHG

## 2019-04-17 ENCOUNTER — RESULT REVIEW (OUTPATIENT)
Age: 34
End: 2019-04-17

## 2019-04-17 ENCOUNTER — TRANSCRIPTION ENCOUNTER (OUTPATIENT)
Age: 34
End: 2019-04-17

## 2019-04-17 ENCOUNTER — INPATIENT (INPATIENT)
Facility: HOSPITAL | Age: 34
LOS: 1 days | Discharge: HOME | DRG: 620 | End: 2019-04-19
Attending: SURGERY | Admitting: SURGERY
Payer: COMMERCIAL

## 2019-04-17 ENCOUNTER — APPOINTMENT (OUTPATIENT)
Dept: SURGERY | Facility: HOSPITAL | Age: 34
End: 2019-04-17
Payer: COMMERCIAL

## 2019-04-17 DIAGNOSIS — E66.01 MORBID (SEVERE) OBESITY DUE TO EXCESS CALORIES: ICD-10-CM

## 2019-04-17 DIAGNOSIS — K80.20 CALCULUS OF GALLBLADDER WITHOUT CHOLECYSTITIS WITHOUT OBSTRUCTION: ICD-10-CM

## 2019-04-17 LAB
ANION GAP SERPL CALC-SCNC: 17 MMOL/L — SIGNIFICANT CHANGE UP (ref 5–17)
BASOPHILS # BLD AUTO: 0 K/UL — SIGNIFICANT CHANGE UP (ref 0–0.2)
BASOPHILS NFR BLD AUTO: 0.2 % — SIGNIFICANT CHANGE UP (ref 0–2)
BUN SERPL-MCNC: 6 MG/DL — LOW (ref 7–23)
CALCIUM SERPL-MCNC: 8.8 MG/DL — SIGNIFICANT CHANGE UP (ref 8.4–10.5)
CHLORIDE SERPL-SCNC: 100 MMOL/L — SIGNIFICANT CHANGE UP (ref 96–108)
CO2 SERPL-SCNC: 19 MMOL/L — LOW (ref 22–31)
CREAT SERPL-MCNC: 0.61 MG/DL — SIGNIFICANT CHANGE UP (ref 0.5–1.3)
EOSINOPHIL # BLD AUTO: 0 K/UL — SIGNIFICANT CHANGE UP (ref 0–0.5)
EOSINOPHIL NFR BLD AUTO: 0.1 % — SIGNIFICANT CHANGE UP (ref 0–6)
GLUCOSE BLDC GLUCOMTR-MCNC: 82 MG/DL — SIGNIFICANT CHANGE UP (ref 70–99)
GLUCOSE SERPL-MCNC: 143 MG/DL — HIGH (ref 70–99)
HCG UR QL: NEGATIVE — SIGNIFICANT CHANGE UP
HCT VFR BLD CALC: 38.8 % — SIGNIFICANT CHANGE UP (ref 34.5–45)
HGB BLD-MCNC: 13.1 G/DL — SIGNIFICANT CHANGE UP (ref 11.5–15.5)
LYMPHOCYTES # BLD AUTO: 1.5 K/UL — SIGNIFICANT CHANGE UP (ref 1–3.3)
LYMPHOCYTES # BLD AUTO: 9.1 % — LOW (ref 13–44)
MAGNESIUM SERPL-MCNC: 2 MG/DL — SIGNIFICANT CHANGE UP (ref 1.6–2.6)
MCHC RBC-ENTMCNC: 29.7 PG — SIGNIFICANT CHANGE UP (ref 27–34)
MCHC RBC-ENTMCNC: 33.7 GM/DL — SIGNIFICANT CHANGE UP (ref 32–36)
MCV RBC AUTO: 88 FL — SIGNIFICANT CHANGE UP (ref 80–100)
MONOCYTES # BLD AUTO: 0.1 K/UL — SIGNIFICANT CHANGE UP (ref 0–0.9)
MONOCYTES NFR BLD AUTO: 0.8 % — LOW (ref 2–14)
NEUTROPHILS # BLD AUTO: 14.9 K/UL — HIGH (ref 1.8–7.4)
NEUTROPHILS NFR BLD AUTO: 89.8 % — HIGH (ref 43–77)
PHOSPHATE SERPL-MCNC: 2.2 MG/DL — LOW (ref 2.5–4.5)
PLATELET # BLD AUTO: 155 K/UL — SIGNIFICANT CHANGE UP (ref 150–400)
POTASSIUM SERPL-MCNC: 3.8 MMOL/L — SIGNIFICANT CHANGE UP (ref 3.5–5.3)
POTASSIUM SERPL-SCNC: 3.8 MMOL/L — SIGNIFICANT CHANGE UP (ref 3.5–5.3)
RBC # BLD: 4.41 M/UL — SIGNIFICANT CHANGE UP (ref 3.8–5.2)
RBC # FLD: 12.4 % — SIGNIFICANT CHANGE UP (ref 10.3–14.5)
SODIUM SERPL-SCNC: 136 MMOL/L — SIGNIFICANT CHANGE UP (ref 135–145)
WBC # BLD: 16.7 K/UL — HIGH (ref 3.8–10.5)
WBC # FLD AUTO: 16.7 K/UL — HIGH (ref 3.8–10.5)

## 2019-04-17 PROCEDURE — 43775 LAP SLEEVE GASTRECTOMY: CPT

## 2019-04-17 PROCEDURE — 88305 TISSUE EXAM BY PATHOLOGIST: CPT | Mod: 26

## 2019-04-17 PROCEDURE — 88304 TISSUE EXAM BY PATHOLOGIST: CPT | Mod: 26

## 2019-04-17 PROCEDURE — 47562 LAPAROSCOPIC CHOLECYSTECTOMY: CPT

## 2019-04-17 PROCEDURE — 47562 LAPAROSCOPIC CHOLECYSTECTOMY: CPT | Mod: 82

## 2019-04-17 PROCEDURE — 43775 LAP SLEEVE GASTRECTOMY: CPT | Mod: 82

## 2019-04-17 RX ORDER — ONDANSETRON 8 MG/1
4 TABLET, FILM COATED ORAL ONCE
Refills: 0 | Status: DISCONTINUED | OUTPATIENT
Start: 2019-04-17 | End: 2019-04-17

## 2019-04-17 RX ORDER — ONDANSETRON 8 MG/1
1 TABLET, FILM COATED ORAL
Qty: 21 | Refills: 0
Start: 2019-04-17 | End: 2019-04-23

## 2019-04-17 RX ORDER — ACETAMINOPHEN 500 MG
15 TABLET ORAL
Qty: 300 | Refills: 0
Start: 2019-04-17 | End: 2019-04-21

## 2019-04-17 RX ORDER — OXYCODONE HYDROCHLORIDE 5 MG/1
5 TABLET ORAL EVERY 4 HOURS
Refills: 0 | Status: DISCONTINUED | OUTPATIENT
Start: 2019-04-17 | End: 2019-04-19

## 2019-04-17 RX ORDER — HYOSCYAMINE SULFATE 0.13 MG
1 TABLET ORAL
Qty: 28 | Refills: 0
Start: 2019-04-17 | End: 2019-04-23

## 2019-04-17 RX ORDER — OMEPRAZOLE 10 MG/1
1 CAPSULE, DELAYED RELEASE ORAL
Qty: 30 | Refills: 0
Start: 2019-04-17 | End: 2019-05-16

## 2019-04-17 RX ORDER — ACETAMINOPHEN 500 MG
1000 TABLET ORAL ONCE
Refills: 0 | Status: COMPLETED | OUTPATIENT
Start: 2019-04-17 | End: 2019-04-17

## 2019-04-17 RX ORDER — FENTANYL CITRATE 50 UG/ML
25 INJECTION INTRAVENOUS
Refills: 0 | Status: DISCONTINUED | OUTPATIENT
Start: 2019-04-17 | End: 2019-04-17

## 2019-04-17 RX ORDER — PANTOPRAZOLE SODIUM 20 MG/1
40 TABLET, DELAYED RELEASE ORAL EVERY 24 HOURS
Refills: 0 | Status: DISCONTINUED | OUTPATIENT
Start: 2019-04-17 | End: 2019-04-19

## 2019-04-17 RX ORDER — KETOROLAC TROMETHAMINE 30 MG/ML
30 SYRINGE (ML) INJECTION EVERY 6 HOURS
Refills: 0 | Status: DISCONTINUED | OUTPATIENT
Start: 2019-04-17 | End: 2019-04-18

## 2019-04-17 RX ORDER — HYOSCYAMINE SULFATE 0.13 MG
0.12 TABLET ORAL EVERY 6 HOURS
Refills: 0 | Status: DISCONTINUED | OUTPATIENT
Start: 2019-04-17 | End: 2019-04-19

## 2019-04-17 RX ORDER — POTASSIUM PHOSPHATE, MONOBASIC POTASSIUM PHOSPHATE, DIBASIC 236; 224 MG/ML; MG/ML
15 INJECTION, SOLUTION INTRAVENOUS ONCE
Refills: 0 | Status: COMPLETED | OUTPATIENT
Start: 2019-04-17 | End: 2019-04-17

## 2019-04-17 RX ORDER — POTASSIUM PHOSPHATE, MONOBASIC POTASSIUM PHOSPHATE, DIBASIC 236; 224 MG/ML; MG/ML
15 INJECTION, SOLUTION INTRAVENOUS ONCE
Refills: 0 | Status: DISCONTINUED | OUTPATIENT
Start: 2019-04-17 | End: 2019-04-17

## 2019-04-17 RX ORDER — HEPARIN SODIUM 5000 [USP'U]/ML
5000 INJECTION INTRAVENOUS; SUBCUTANEOUS EVERY 8 HOURS
Refills: 0 | Status: DISCONTINUED | OUTPATIENT
Start: 2019-04-17 | End: 2019-04-19

## 2019-04-17 RX ORDER — CEFAZOLIN SODIUM 1 G
2000 VIAL (EA) INJECTION EVERY 8 HOURS
Refills: 0 | Status: COMPLETED | OUTPATIENT
Start: 2019-04-17 | End: 2019-04-18

## 2019-04-17 RX ORDER — SODIUM CHLORIDE 9 MG/ML
1000 INJECTION, SOLUTION INTRAVENOUS
Refills: 0 | Status: DISCONTINUED | OUTPATIENT
Start: 2019-04-17 | End: 2019-04-19

## 2019-04-17 RX ORDER — ACETAMINOPHEN 500 MG
1000 TABLET ORAL ONCE
Refills: 0 | Status: COMPLETED | OUTPATIENT
Start: 2019-04-18 | End: 2019-04-18

## 2019-04-17 RX ORDER — ONDANSETRON 8 MG/1
4 TABLET, FILM COATED ORAL EVERY 6 HOURS
Refills: 0 | Status: DISCONTINUED | OUTPATIENT
Start: 2019-04-17 | End: 2019-04-19

## 2019-04-17 RX ADMIN — ONDANSETRON 4 MILLIGRAM(S): 8 TABLET, FILM COATED ORAL at 17:47

## 2019-04-17 RX ADMIN — SODIUM CHLORIDE 250 MILLILITER(S): 9 INJECTION, SOLUTION INTRAVENOUS at 13:12

## 2019-04-17 RX ADMIN — PANTOPRAZOLE SODIUM 40 MILLIGRAM(S): 20 TABLET, DELAYED RELEASE ORAL at 10:00

## 2019-04-17 RX ADMIN — HEPARIN SODIUM 5000 UNIT(S): 5000 INJECTION INTRAVENOUS; SUBCUTANEOUS at 17:56

## 2019-04-17 RX ADMIN — ONDANSETRON 4 MILLIGRAM(S): 8 TABLET, FILM COATED ORAL at 13:00

## 2019-04-17 RX ADMIN — Medication 400 MILLIGRAM(S): at 21:43

## 2019-04-17 RX ADMIN — HEPARIN SODIUM 5000 UNIT(S): 5000 INJECTION INTRAVENOUS; SUBCUTANEOUS at 06:47

## 2019-04-17 RX ADMIN — FENTANYL CITRATE 25 MICROGRAM(S): 50 INJECTION INTRAVENOUS at 10:26

## 2019-04-17 RX ADMIN — Medication 30 MILLIGRAM(S): at 21:50

## 2019-04-17 RX ADMIN — Medication 100 MILLIGRAM(S): at 15:54

## 2019-04-17 RX ADMIN — Medication 30 MILLIGRAM(S): at 15:54

## 2019-04-17 RX ADMIN — POTASSIUM PHOSPHATE, MONOBASIC POTASSIUM PHOSPHATE, DIBASIC 62.5 MILLIMOLE(S): 236; 224 INJECTION, SOLUTION INTRAVENOUS at 18:00

## 2019-04-17 RX ADMIN — FENTANYL CITRATE 25 MICROGRAM(S): 50 INJECTION INTRAVENOUS at 11:00

## 2019-04-17 RX ADMIN — Medication 1000 MILLIGRAM(S): at 14:58

## 2019-04-17 RX ADMIN — Medication 0.12 MILLIGRAM(S): at 17:46

## 2019-04-17 RX ADMIN — Medication 30 MILLIGRAM(S): at 21:38

## 2019-04-17 RX ADMIN — Medication 30 MILLIGRAM(S): at 17:23

## 2019-04-17 RX ADMIN — FENTANYL CITRATE 25 MICROGRAM(S): 50 INJECTION INTRAVENOUS at 10:13

## 2019-04-17 RX ADMIN — Medication 400 MILLIGRAM(S): at 13:30

## 2019-04-17 RX ADMIN — Medication 85 MILLIMOLE(S): at 21:38

## 2019-04-17 RX ADMIN — FENTANYL CITRATE 25 MICROGRAM(S): 50 INJECTION INTRAVENOUS at 11:26

## 2019-04-17 RX ADMIN — Medication 0.5 MILLIGRAM(S): at 12:04

## 2019-04-17 RX ADMIN — OXYCODONE HYDROCHLORIDE 5 MILLIGRAM(S): 5 TABLET ORAL at 18:28

## 2019-04-17 RX ADMIN — SODIUM CHLORIDE 150 MILLILITER(S): 9 INJECTION, SOLUTION INTRAVENOUS at 20:01

## 2019-04-17 RX ADMIN — Medication 0.12 MILLIGRAM(S): at 10:25

## 2019-04-17 RX ADMIN — Medication 1000 MILLIGRAM(S): at 21:49

## 2019-04-17 RX ADMIN — OXYCODONE HYDROCHLORIDE 5 MILLIGRAM(S): 5 TABLET ORAL at 18:48

## 2019-04-17 NOTE — BRIEF OPERATIVE NOTE - NSICDXBRIEFPREOP_GEN_ALL_CORE_FT
PRE-OP DIAGNOSIS:  Morbid obesity 17-Apr-2019 09:32:13  Alvin Aguirre  Biliary colic 17-Apr-2019 09:32:07  Alvin Aguirre

## 2019-04-17 NOTE — CHART NOTE - NSCHARTNOTEFT_GEN_A_CORE
RISK FACTORS:     Procedure: Sleeve gastrectomy (4 points)  AGE: 33 female (2 points)   Patient with BMI of: 41.4 kg  (2 points)   Never smoker: ( 0 Points)   Male Sex: No (0 points)   OR time > 3 hours:   Prior History of VTE: No  (0 points)     Total Points: 8 points   ** No Lovenox noted for discharge Low risk group (0-14 points) RISK FACTORS:     Procedure: Sleeve gastrectomy (4 points)  AGE: 33 female (2 points)   Patient with BMI of: 41.4 kg  (2 points)   Never smoker: ( 0 Points)   Male Sex: No (0 points)   OR time > 3 hours: 7:30 am - 9:25 am ( 2 hours) : 0 points   Prior History of VTE: No  (0 points)     Total Points: 8 points   ** No Lovenox noted for discharge Low risk group (0-14 points)

## 2019-04-17 NOTE — BRIEF OPERATIVE NOTE - NSICDXBRIEFPROCEDURE_GEN_ALL_CORE_FT
PROCEDURES:  Laparoscopic sleeve gastrectomy with cholecystectomy 17-Apr-2019 09:31:57  Alvin Aguirre

## 2019-04-17 NOTE — BRIEF OPERATIVE NOTE - NSICDXBRIEFPOSTOP_GEN_ALL_CORE_FT
POST-OP DIAGNOSIS:  Morbid obesity 17-Apr-2019 09:32:27  Alvin Aguirre  Biliary colic 17-Apr-2019 09:32:21  Alvin Aguirre

## 2019-04-17 NOTE — DISCHARGE NOTE PROVIDER - HOSPITAL COURSE
n April 16, 2019 Ms. Cabrera underwent a laparoscopic sleeve gastrectomy for morbid obesity BMI 41. She received DVT and SSI prophylaxis prior to start of surgery. Patient tolerated the procedure well, was extubated and sent to PACU in stable condition. Once hemodynamically stable and effective pain control, she ambulated with assistance and voided within 4 hours.  She was later transferred to a bariatric unit and placed on bedside continuous pulse oximetry.  Her pain was controlled by IV multi-modal non-opioid analgesia then transitioned to liquid oral pain medications as needed. Later that afternoon she was started on bariatric clear liquid diet .        On POD #1 she remained stable with no acute events overnight. She continued to tolerate increase oral intake without nausea and vomiting. Patient is ambulating independently, voiding and has effective pain control.  The rest of her hospital course was uneventful, met Bariatric 8-Point Discharge criteria and cleared for discharge home on POD#1. She will follow a protocol-derived staged meal progression supervised by her dietician in the outpatient setting. Patient will follow-up with Dr. Quintana in 1-2 weeks. She was also advised to follow-up with her primary medical doctor in 1-2 weeks as well as her nutritionist in 30 days.  Written procedure specific discharge instruction explained and given to include VTE prevention.  [facility] prior to discharge home. n April 16, 2019 Ms. Cabrera underwent a laparoscopic cholecystectomy and sleeve gastrectomy for morbid obesity BMI 41. She received DVT and SSI prophylaxis prior to start of surgery. Patient tolerated the procedure well, was extubated and sent to PACU in stable condition. Once hemodynamically stable and effective pain control, she ambulated with assistance and voided within 4 hours.  She was later transferred to a bariatric unit and placed on bedside continuous pulse oximetry.  Her pain was controlled by IV multi-modal non-opioid analgesia then transitioned to liquid oral pain medications as needed. Later that afternoon she was started on bariatric clear liquid diet .        On POD #1 she remained stable with no acute events overnight. c/o of increase abdominal pain for which she received intravenous analgesia, She continued to tolerate increase oral intake without nausea and vomiting. Patient is ambulating independently, voiding and has effective pain control. On POD#1, she complained of pain in the back of her throat and seen by ENT for same. No ulcers noted, Uvula not swollen, Strep test send and reported negative. She was subsequently cleared by ENT. The rest of her hospital course was uneventful, met Bariatric 8-Point Discharge criteria on POD#2 and cleared for discharge. She will follow a protocol-derived staged meal progression supervised by her dietician in the outpatient setting. Patient will follow-up with Dr. Quintana in 1-2 weeks. She was also advised to follow-up with her primary medical doctor in 1-2 weeks, nutritionist in 30 days as well as her endocrinologist for enlarged thyroid .  Written procedure specific discharge instruction explained and given to include VTE prevention.  All appropriate [facility] obtained from [facility] prior to discharge home.

## 2019-04-17 NOTE — DISCHARGE NOTE PROVIDER - NSDCCPTREATMENT_GEN_ALL_CORE_FT
PRINCIPAL PROCEDURE  Procedure: Laparoscopic sleeve gastrectomy with cholecystectomy  Findings and Treatment:

## 2019-04-17 NOTE — DISCHARGE NOTE PROVIDER - CARE PROVIDER_API CALL
Llye Quintana)  Surgery  [address], [address]  [place] [address]  Phone: [phone]  Fax: [phone]  Follow Up Time:

## 2019-04-17 NOTE — DISCHARGE NOTE PROVIDER - NSDCCPCAREPLAN_GEN_ALL_CORE_FT
PRINCIPAL DISCHARGE DIAGNOSIS  Diagnosis: Obesity  Assessment and Plan of Treatment: pt will engage in healthy lifestyle changes to improve quality of life and weight loss      SECONDARY DISCHARGE DIAGNOSES  Diagnosis: Cholelithiasis  Assessment and Plan of Treatment: Avoid fatty food

## 2019-04-18 LAB
ANION GAP SERPL CALC-SCNC: 11 MMOL/L — SIGNIFICANT CHANGE UP (ref 5–17)
BASOPHILS # BLD AUTO: 0 K/UL — SIGNIFICANT CHANGE UP (ref 0–0.2)
BASOPHILS NFR BLD AUTO: 0.4 % — SIGNIFICANT CHANGE UP (ref 0–2)
BUN SERPL-MCNC: <4 MG/DL — LOW (ref 7–23)
CALCIUM SERPL-MCNC: 9 MG/DL — SIGNIFICANT CHANGE UP (ref 8.4–10.5)
CHLORIDE SERPL-SCNC: 105 MMOL/L — SIGNIFICANT CHANGE UP (ref 96–108)
CO2 SERPL-SCNC: 21 MMOL/L — LOW (ref 22–31)
CREAT SERPL-MCNC: 0.51 MG/DL — SIGNIFICANT CHANGE UP (ref 0.5–1.3)
EOSINOPHIL # BLD AUTO: 0 K/UL — SIGNIFICANT CHANGE UP (ref 0–0.5)
EOSINOPHIL NFR BLD AUTO: 0.1 % — SIGNIFICANT CHANGE UP (ref 0–6)
GLUCOSE SERPL-MCNC: 80 MG/DL — SIGNIFICANT CHANGE UP (ref 70–99)
HCT VFR BLD CALC: 36.5 % — SIGNIFICANT CHANGE UP (ref 34.5–45)
HGB BLD-MCNC: 12.1 G/DL — SIGNIFICANT CHANGE UP (ref 11.5–15.5)
LYMPHOCYTES # BLD AUTO: 27.2 % — SIGNIFICANT CHANGE UP (ref 13–44)
LYMPHOCYTES # BLD AUTO: 3.2 K/UL — SIGNIFICANT CHANGE UP (ref 1–3.3)
MAGNESIUM SERPL-MCNC: 2.1 MG/DL — SIGNIFICANT CHANGE UP (ref 1.6–2.6)
MCHC RBC-ENTMCNC: 29.2 PG — SIGNIFICANT CHANGE UP (ref 27–34)
MCHC RBC-ENTMCNC: 33.1 GM/DL — SIGNIFICANT CHANGE UP (ref 32–36)
MCV RBC AUTO: 88.2 FL — SIGNIFICANT CHANGE UP (ref 80–100)
MONOCYTES # BLD AUTO: 0.9 K/UL — SIGNIFICANT CHANGE UP (ref 0–0.9)
MONOCYTES NFR BLD AUTO: 7.4 % — SIGNIFICANT CHANGE UP (ref 2–14)
NEUTROPHILS # BLD AUTO: 7.5 K/UL — HIGH (ref 1.8–7.4)
NEUTROPHILS NFR BLD AUTO: 64.9 % — SIGNIFICANT CHANGE UP (ref 43–77)
PHOSPHATE SERPL-MCNC: 2.8 MG/DL — SIGNIFICANT CHANGE UP (ref 2.5–4.5)
PLATELET # BLD AUTO: 171 K/UL — SIGNIFICANT CHANGE UP (ref 150–400)
POTASSIUM SERPL-MCNC: 3.8 MMOL/L — SIGNIFICANT CHANGE UP (ref 3.5–5.3)
POTASSIUM SERPL-SCNC: 3.8 MMOL/L — SIGNIFICANT CHANGE UP (ref 3.5–5.3)
RBC # BLD: 4.14 M/UL — SIGNIFICANT CHANGE UP (ref 3.8–5.2)
RBC # FLD: 12.6 % — SIGNIFICANT CHANGE UP (ref 10.3–14.5)
SODIUM SERPL-SCNC: 137 MMOL/L — SIGNIFICANT CHANGE UP (ref 135–145)
WBC # BLD: 11.6 K/UL — HIGH (ref 3.8–10.5)
WBC # FLD AUTO: 11.6 K/UL — HIGH (ref 3.8–10.5)

## 2019-04-18 RX ORDER — ACETAMINOPHEN 500 MG
650 TABLET ORAL ONCE
Refills: 0 | Status: COMPLETED | OUTPATIENT
Start: 2019-04-18 | End: 2019-04-18

## 2019-04-18 RX ORDER — BENZOCAINE AND MENTHOL 5; 1 G/100ML; G/100ML
1 LIQUID ORAL
Refills: 0 | Status: DISCONTINUED | OUTPATIENT
Start: 2019-04-18 | End: 2019-04-19

## 2019-04-18 RX ADMIN — Medication 30 MILLIGRAM(S): at 03:24

## 2019-04-18 RX ADMIN — HEPARIN SODIUM 5000 UNIT(S): 5000 INJECTION INTRAVENOUS; SUBCUTANEOUS at 10:31

## 2019-04-18 RX ADMIN — Medication 650 MILLIGRAM(S): at 22:30

## 2019-04-18 RX ADMIN — Medication 0.12 MILLIGRAM(S): at 05:58

## 2019-04-18 RX ADMIN — Medication 0.12 MILLIGRAM(S): at 01:07

## 2019-04-18 RX ADMIN — Medication 0.12 MILLIGRAM(S): at 17:18

## 2019-04-18 RX ADMIN — HEPARIN SODIUM 5000 UNIT(S): 5000 INJECTION INTRAVENOUS; SUBCUTANEOUS at 01:09

## 2019-04-18 RX ADMIN — Medication 400 MILLIGRAM(S): at 03:14

## 2019-04-18 RX ADMIN — SODIUM CHLORIDE 150 MILLILITER(S): 9 INJECTION, SOLUTION INTRAVENOUS at 03:24

## 2019-04-18 RX ADMIN — ONDANSETRON 4 MILLIGRAM(S): 8 TABLET, FILM COATED ORAL at 01:08

## 2019-04-18 RX ADMIN — Medication 1000 MILLIGRAM(S): at 03:24

## 2019-04-18 RX ADMIN — Medication 30 MILLIGRAM(S): at 10:30

## 2019-04-18 RX ADMIN — Medication 0.12 MILLIGRAM(S): at 12:05

## 2019-04-18 RX ADMIN — ONDANSETRON 4 MILLIGRAM(S): 8 TABLET, FILM COATED ORAL at 05:58

## 2019-04-18 RX ADMIN — BENZOCAINE AND MENTHOL 1 LOZENGE: 5; 1 LIQUID ORAL at 20:31

## 2019-04-18 RX ADMIN — HEPARIN SODIUM 5000 UNIT(S): 5000 INJECTION INTRAVENOUS; SUBCUTANEOUS at 17:21

## 2019-04-18 RX ADMIN — Medication 650 MILLIGRAM(S): at 23:00

## 2019-04-18 RX ADMIN — ONDANSETRON 4 MILLIGRAM(S): 8 TABLET, FILM COATED ORAL at 17:18

## 2019-04-18 RX ADMIN — PANTOPRAZOLE SODIUM 40 MILLIGRAM(S): 20 TABLET, DELAYED RELEASE ORAL at 10:33

## 2019-04-18 RX ADMIN — ONDANSETRON 4 MILLIGRAM(S): 8 TABLET, FILM COATED ORAL at 12:06

## 2019-04-18 RX ADMIN — Medication 100 MILLIGRAM(S): at 01:38

## 2019-04-18 NOTE — PROGRESS NOTE ADULT - ASSESSMENT
33 year old female with morbid obesity and biliary colic s/p lap gastric sleeve and cholecystectomy (4/17), recovering well.    - bariatric CLD  - pain control  - OOB/ambulate  - no Lvx on d/c per low risk stratification  - monitor for GI function  - Dispo once 8 point bariatric surgery criteria met    Green surg  [id number]

## 2019-04-18 NOTE — DIETITIAN INITIAL EVALUATION ADULT. - OTHER INFO
Pt seen for bariatric surgery consult on 2MON. Pt with multiple previous wt loss attempts per chart. Pt tried various diet programs and was unable to lose and maintain significant wt loss. Per chart, pt met with outpatient RD and weighed 241 pounds. Pre-surgical wt (H&P) noted as 228 pounds (4/10/19). Current wt of 226 pounds (4/17/19). Pt now S/P laparoscopic vertical sleeve gastrectomy. Pt denies N+V, sipping on bariatric clear liquids during RD visit. Pt with knowledge of bariatric full liquid diet and receptive to in depth review/reinforcement. Pt reports home stock of protein shakes with plans to purchase more. Pt reports purchasing the necessary vitamins/minerals in patch form including a multivitamin with iron, calcium, vitamin D, and  B12.  Pt plans to schedule a follow up appointment with outpatient RD. Pt able to teach back all points discussed during interview.

## 2019-04-18 NOTE — DIETITIAN INITIAL EVALUATION ADULT. - ADHERENCE
Pt reports following a full liquid diet for 2 weeks PTA as instructed by outpatient RD. Pt reports having Premier Protein, yogurt, soup and milk. Pt uses multivitamin patches. NKFA./good

## 2019-04-18 NOTE — PHARMACOTHERAPY INTERVENTION NOTE - COMMENTS
Spoke to patient about absorption issues with medications and the need to crush tablets or open capsules for the next 30 days. Reinforced discussion points from previous counseling. Informed patient of new prescriptions sent to pharmacy.    Claudia Mohr, PharmD  PGY2 Pharmacotherapy Resident  Pager: [phone]

## 2019-04-19 ENCOUNTER — TRANSCRIPTION ENCOUNTER (OUTPATIENT)
Age: 34
End: 2019-04-19

## 2019-04-19 VITALS
DIASTOLIC BLOOD PRESSURE: 80 MMHG | OXYGEN SATURATION: 100 % | SYSTOLIC BLOOD PRESSURE: 120 MMHG | TEMPERATURE: 99 F | HEART RATE: 77 BPM | RESPIRATION RATE: 18 BRPM

## 2019-04-19 DIAGNOSIS — J02.9 ACUTE PHARYNGITIS, UNSPECIFIED: ICD-10-CM

## 2019-04-19 LAB — S PYO AG SPEC QL IA: NEGATIVE — SIGNIFICANT CHANGE UP

## 2019-04-19 PROCEDURE — 99222 1ST HOSP IP/OBS MODERATE 55: CPT

## 2019-04-19 PROCEDURE — 88304 TISSUE EXAM BY PATHOLOGIST: CPT

## 2019-04-19 PROCEDURE — 87081 CULTURE SCREEN ONLY: CPT

## 2019-04-19 PROCEDURE — 88305 TISSUE EXAM BY PATHOLOGIST: CPT

## 2019-04-19 PROCEDURE — 81025 URINE PREGNANCY TEST: CPT

## 2019-04-19 PROCEDURE — 82962 GLUCOSE BLOOD TEST: CPT

## 2019-04-19 PROCEDURE — 80048 BASIC METABOLIC PNL TOTAL CA: CPT

## 2019-04-19 PROCEDURE — 84100 ASSAY OF PHOSPHORUS: CPT

## 2019-04-19 PROCEDURE — 85027 COMPLETE CBC AUTOMATED: CPT

## 2019-04-19 PROCEDURE — C1889: CPT

## 2019-04-19 PROCEDURE — 87880 STREP A ASSAY W/OPTIC: CPT

## 2019-04-19 PROCEDURE — 83735 ASSAY OF MAGNESIUM: CPT

## 2019-04-19 PROCEDURE — 86308 HETEROPHILE ANTIBODY SCREEN: CPT

## 2019-04-19 RX ORDER — ACETAMINOPHEN 500 MG
650 TABLET ORAL EVERY 6 HOURS
Refills: 0 | Status: DISCONTINUED | OUTPATIENT
Start: 2019-04-19 | End: 2019-04-19

## 2019-04-19 RX ADMIN — HEPARIN SODIUM 5000 UNIT(S): 5000 INJECTION INTRAVENOUS; SUBCUTANEOUS at 01:10

## 2019-04-19 RX ADMIN — Medication 0.12 MILLIGRAM(S): at 01:09

## 2019-04-19 RX ADMIN — Medication 650 MILLIGRAM(S): at 11:20

## 2019-04-19 RX ADMIN — ONDANSETRON 4 MILLIGRAM(S): 8 TABLET, FILM COATED ORAL at 07:05

## 2019-04-19 RX ADMIN — BENZOCAINE AND MENTHOL 1 LOZENGE: 5; 1 LIQUID ORAL at 07:26

## 2019-04-19 RX ADMIN — Medication 650 MILLIGRAM(S): at 11:50

## 2019-04-19 RX ADMIN — Medication 0.12 MILLIGRAM(S): at 11:21

## 2019-04-19 RX ADMIN — PANTOPRAZOLE SODIUM 40 MILLIGRAM(S): 20 TABLET, DELAYED RELEASE ORAL at 09:22

## 2019-04-19 RX ADMIN — SODIUM CHLORIDE 150 MILLILITER(S): 9 INJECTION, SOLUTION INTRAVENOUS at 07:06

## 2019-04-19 RX ADMIN — ONDANSETRON 4 MILLIGRAM(S): 8 TABLET, FILM COATED ORAL at 01:09

## 2019-04-19 RX ADMIN — BENZOCAINE AND MENTHOL 1 LOZENGE: 5; 1 LIQUID ORAL at 11:24

## 2019-04-19 RX ADMIN — Medication 0.12 MILLIGRAM(S): at 07:06

## 2019-04-19 NOTE — CONSULT NOTE ADULT - SUBJECTIVE AND OBJECTIVE BOX
CC: "Sore throat"    Interval HPI: 33 year old female with morbid obesity and biliary colic s/p lap gastric sleeve and cholecystectomy POD # 2. We are asked to evaluate the patient secondary to complaints of sore throat since her surgery. Patient states     PAST MEDICAL & SURGICAL HISTORY:  Lumbago  Multiple thyroid nodules: large; biopsies have always been negative; TFTs are normal  Hiatal hernia: found on endoscopy (w/u for sleeve)  Obesity  Gallstones: diagnosed 2016  PCOS (polycystic ovarian syndrome)  No significant past surgical history    Allergies    No Known Allergies    Intolerances      MEDICATIONS  (STANDING):  heparin  Injectable 5000 Unit(s) SubCutaneous every 8 hours  hyoscyamine SL 0.125 milliGRAM(s) SubLingual every 6 hours  lactated ringers. 1000 milliLiter(s) (150 mL/Hr) IV Continuous <Continuous>  multivitamin/thiamine/folic acid in sodium chloride 0.9% 1000 milliLiter(s) (250 mL/Hr) IV Continuous <Continuous>  ondansetron Injectable 4 milliGRAM(s) IV Push every 6 hours  pantoprazole  Injectable 40 milliGRAM(s) IV Push every 24 hours    MEDICATIONS  (PRN):  benzocaine 15 mG/menthol 3.6 mG Lozenge 1 Lozenge Oral every 2 hours PRN Sore Throat  oxyCODONE    Solution 5 milliGRAM(s) Oral every 4 hours PRN Moderate Pain (4 - 6)      Social History: Denies smoking/Alcohol    Family history: No significant family hx.    ROS:   ENT: all negative except as noted in HPI   CV: denies palpitations  Pulm: denies SOB, cough, hemoptysis  GI: denies change in apetite, indigestion, n/v. Tolerating CLD  : denies pertinent urinary symptoms, urgency  Neuro: denies numbness/tingling, loss of sensation  Psych: denies anxiety  MS: denies muscle weakness, instability  Heme: denies easy bruising or bleeding  Endo: denies heat/cold intolerance, excessive sweating  Vascular: denies LE edema    Vital Signs Last 24 Hrs  T(C): 36.3 (19 Apr 2019 05:53), Max: 37.5 (18 Apr 2019 14:22)  T(F): 97.4 (19 Apr 2019 05:53), Max: 99.5 (18 Apr 2019 14:22)  HR: 74 (19 Apr 2019 05:53) (58 - 96)  BP: 110/74 (19 Apr 2019 05:53) (95/64 - 121/78)  BP(mean): --  RR: 18 (19 Apr 2019 05:53) (18 - 18)  SpO2: 97% (19 Apr 2019 05:53) (93% - 100%)                          12.1   11.6  )-----------( 171      ( 18 Apr 2019 07:25 )             36.5    04-18    137  |  105  |  <4<L>  ----------------------------<  80  3.8   |  21<L>  |  0.51    Ca    9.0      18 Apr 2019 07:20  Phos  2.8     04-18  Mg     2.1     04-18         PHYSICAL EXAM:  Gen: NAD  Skin: No rashes, bruises, or lesions  Head: Normocephalic, Atraumatic  Face: no edema, erythema, or fluctuance. Parotid glands soft without mass  Eyes: no scleral injection  Ears: Right - ear canal clear, TM intact without effusion or erythema. No evidence of any fluid drainage. No mastoid tenderness, erythema, or ear bulging            Left - ear canal clear, TM intact without effusion or erythema. No evidence of any fluid drainage. No mastoid tenderness, erythema, or ear bulging  Nose: Nares bilaterally patent, no discharge  Mouth: No Stridor / Drooling / Trismus.  Mucosa moist, tongue/uvula midline, oropharynx clear  Neck: Flat, supple, no lymphadenopathy, trachea midline, no masses  Lymphatic: No lymphadenopathy  Resp: breathing easily, no stridor  CV: no peripheral edema/cyanosis  GI: nondistended   Peripheral vascular: no JVD or edema  Neuro: facial nerve intact, no facial droop        Diagnostic Nasal Endoscopy: (Scope #2 used)    Fiberoptic Indirect laryngoscopy:  (Scope #2 used)        IMAGING/ADDITIONAL STUDIES: CC: "Sore throat"    Interval HPI: 33 year old female with morbid obesity and biliary colic s/p lap gastric sleeve and cholecystectomy POD # 2. We are asked to evaluate the patient secondary to complaints of sore throat since her surgery. Patient states that before her surgery last week Friday, she felt as if she was coming up with a URI. Patient states sore throat began after her surgery 2 days ago. Pain has not worsened but states that she has trouble swallowing because of pain. Patient states she feels her uvula is swollen   and she feels that she can feel it with her tongue. Patient states she has not had this problem before her surgery. Patient tolerating minimal amount of CLD. Denies fevers, shortness of breath, dyspnea, N/V, facial or ear pain.     PAST MEDICAL & SURGICAL HISTORY:  Lumbago  Multiple thyroid nodules: large; biopsies have always been negative; TFTs are normal  Hiatal hernia: found on endoscopy (w/u for sleeve)  Obesity  Gallstones: diagnosed 2016  PCOS (polycystic ovarian syndrome)  No significant past surgical history    Allergies    No Known Allergies    Intolerances      MEDICATIONS  (STANDING):  heparin  Injectable 5000 Unit(s) SubCutaneous every 8 hours  hyoscyamine SL 0.125 milliGRAM(s) SubLingual every 6 hours  lactated ringers. 1000 milliLiter(s) (150 mL/Hr) IV Continuous <Continuous>  multivitamin/thiamine/folic acid in sodium chloride 0.9% 1000 milliLiter(s) (250 mL/Hr) IV Continuous <Continuous>  ondansetron Injectable 4 milliGRAM(s) IV Push every 6 hours  pantoprazole  Injectable 40 milliGRAM(s) IV Push every 24 hours    MEDICATIONS  (PRN):  benzocaine 15 mG/menthol 3.6 mG Lozenge 1 Lozenge Oral every 2 hours PRN Sore Throat  oxyCODONE    Solution 5 milliGRAM(s) Oral every 4 hours PRN Moderate Pain (4 - 6)      Social History: Denies smoking/Alcohol    Family history: No significant family hx.    ROS:   ENT: all negative except as noted in HPI   CV: denies palpitations  Pulm: denies SOB, cough, hemoptysis  GI: denies change in apetite, indigestion, n/v. Tolerating CLD  : denies pertinent urinary symptoms, urgency  Neuro: denies numbness/tingling, loss of sensation  Psych: denies anxiety  MS: denies muscle weakness, instability  Heme: denies easy bruising or bleeding  Endo: denies heat/cold intolerance, excessive sweating  Vascular: denies LE edema    Vital Signs Last 24 Hrs  T(C): 36.3 (19 Apr 2019 05:53), Max: 37.5 (18 Apr 2019 14:22)  T(F): 97.4 (19 Apr 2019 05:53), Max: 99.5 (18 Apr 2019 14:22)  HR: 74 (19 Apr 2019 05:53) (58 - 96)  BP: 110/74 (19 Apr 2019 05:53) (95/64 - 121/78)  BP(mean): --  RR: 18 (19 Apr 2019 05:53) (18 - 18)  SpO2: 97% (19 Apr 2019 05:53) (93% - 100%)                          12.1   11.6  )-----------( 171      ( 18 Apr 2019 07:25 )             36.5    04-18    137  |  105  |  <4<L>  ----------------------------<  80  3.8   |  21<L>  |  0.51    Ca    9.0      18 Apr 2019 07:20  Phos  2.8     04-18  Mg     2.1     04-18         PHYSICAL EXAM:  Gen: NAD  Skin: No rashes, bruises, or lesions  Head: Normocephalic, Atraumatic  Face: no edema, erythema, or fluctuance. Parotid glands soft without mass  Eyes: no scleral injection  Ears: Right - ear canal clear, TM intact without effusion or erythema. No evidence of any fluid drainage. No mastoid tenderness, erythema, or ear bulging            Left - ear canal clear, TM intact without effusion or erythema. No evidence of any fluid drainage. No mastoid tenderness, erythema, or ear bulging  Nose: Nares bilaterally patent, no discharge  Mouth: No Stridor / Drooling / Trismus.  Mucosa moist, tongue, oropharynx clear + elongated uvula with minimal swelling, no edema, minimal erythema. No ulcers, masses.   Neck: Flat, supple, no lymphadenopathy, trachea midline, no masses  Lymphatic: No lymphadenopathy  Resp: breathing easily, no stridor  CV: no peripheral edema/cyanosis  GI: nondistended   Peripheral vascular: no JVD or edema  Neuro: facial nerve intact, no facial droop        Diagnostic Nasal Endoscopy: (Scope #2 used)    Fiberoptic Indirect laryngoscopy:  (Scope #2 used)        IMAGING/ADDITIONAL STUDIES: CC: "Sore throat"    Interval HPI: 33 year old female with morbid obesity and biliary colic s/p lap gastric sleeve and cholecystectomy POD # 2. We are asked to evaluate the patient secondary to complaints of sore throat since her surgery. Patient states that before her surgery last week Friday, she felt as if she was coming up with a URI. Patient states sore throat began after her surgery 2 days ago. Pain has not worsened but states that she has trouble swallowing because of pain. Patient states she feels her uvula is swollen and she feels that she can feel it with her tongue. Patient states she has not had this problem before her surgery. Patient tolerating minimal amount of CLD. Denies fevers, shortness of breath, dyspnea, N/V, facial or ear pain.     PAST MEDICAL & SURGICAL HISTORY:  Lumbago  Multiple thyroid nodules: large; biopsies have always been negative; TFTs are normal  Hiatal hernia: found on endoscopy (w/u for sleeve)  Obesity  Gallstones: diagnosed 2016  PCOS (polycystic ovarian syndrome)  No significant past surgical history    Allergies    No Known Allergies    Intolerances      MEDICATIONS  (STANDING):  heparin  Injectable 5000 Unit(s) SubCutaneous every 8 hours  hyoscyamine SL 0.125 milliGRAM(s) SubLingual every 6 hours  lactated ringers. 1000 milliLiter(s) (150 mL/Hr) IV Continuous <Continuous>  multivitamin/thiamine/folic acid in sodium chloride 0.9% 1000 milliLiter(s) (250 mL/Hr) IV Continuous <Continuous>  ondansetron Injectable 4 milliGRAM(s) IV Push every 6 hours  pantoprazole  Injectable 40 milliGRAM(s) IV Push every 24 hours    MEDICATIONS  (PRN):  benzocaine 15 mG/menthol 3.6 mG Lozenge 1 Lozenge Oral every 2 hours PRN Sore Throat  oxyCODONE    Solution 5 milliGRAM(s) Oral every 4 hours PRN Moderate Pain (4 - 6)      Social History: Denies smoking/Alcohol    Family history: No significant family hx.    ROS:   ENT: all negative except as noted in HPI   CV: denies palpitations  Pulm: denies SOB, cough, hemoptysis  GI: denies change in apetite, indigestion, n/v. Tolerating CLD  : denies pertinent urinary symptoms, urgency  Neuro: denies numbness/tingling, loss of sensation  Psych: denies anxiety  MS: denies muscle weakness, instability  Heme: denies easy bruising or bleeding  Endo: denies heat/cold intolerance, excessive sweating  Vascular: denies LE edema    Vital Signs Last 24 Hrs  T(C): 36.3 (19 Apr 2019 05:53), Max: 37.5 (18 Apr 2019 14:22)  T(F): 97.4 (19 Apr 2019 05:53), Max: 99.5 (18 Apr 2019 14:22)  HR: 74 (19 Apr 2019 05:53) (58 - 96)  BP: 110/74 (19 Apr 2019 05:53) (95/64 - 121/78)  BP(mean): --  RR: 18 (19 Apr 2019 05:53) (18 - 18)  SpO2: 97% (19 Apr 2019 05:53) (93% - 100%)                          12.1   11.6  )-----------( 171      ( 18 Apr 2019 07:25 )             36.5    04-18    137  |  105  |  <4<L>  ----------------------------<  80  3.8   |  21<L>  |  0.51    Ca    9.0      18 Apr 2019 07:20  Phos  2.8     04-18  Mg     2.1     04-18         PHYSICAL EXAM:  Gen: NAD  Skin: No rashes, bruises, or lesions  Head: Normocephalic, Atraumatic  Face: no edema, erythema, or fluctuance. Parotid glands soft without mass  Eyes: no scleral injection  Ears: Right - ear canal clear, TM intact without effusion or erythema. No evidence of any fluid drainage. No mastoid tenderness, erythema, or ear bulging            Left - ear canal clear, TM intact without effusion or erythema. No evidence of any fluid drainage. No mastoid tenderness, erythema, or ear bulging  Nose: Nares bilaterally patent, no discharge  Mouth: No Stridor / Drooling / Trismus.  Mucosa moist, tongue, oropharynx clear + elongated uvula with minimal swelling, no edema, minimal erythema. No ulcers, masses.   Neck: Flat, supple, no lymphadenopathy, trachea midline, no masses  Lymphatic: No lymphadenopathy  Resp: breathing easily, no stridor  CV: no peripheral edema/cyanosis  GI: nondistended   Peripheral vascular: no JVD or edema  Neuro: facial nerve intact, no facial droop        Diagnostic Nasal Endoscopy: (Scope #2 used)    Fiberoptic Indirect laryngoscopy:  (Scope #2 used)        IMAGING/ADDITIONAL STUDIES: CC: "Sore throat"    Interval HPI: 33 year old female with morbid obesity and biliary colic s/p lap gastric sleeve and cholecystectomy POD # 2. We are asked to evaluate the patient secondary to complaints of sore throat since her surgery. Patient states that before her surgery last week Friday, she felt as if she was coming up with a URI. Patient states sore throat began after her surgery 2 days ago. Pain has not worsened but states that she has trouble swallowing because of pain. Patient states she feels her uvula is swollen and she feels that she can feel it with her tongue. Patient states she has not had this problem before her surgery. Patient tolerating minimal amount of CLD. Denies fevers, shortness of breath, dyspnea, N/V, facial or ear pain.     PAST MEDICAL & SURGICAL HISTORY:  Lumbago  Multiple thyroid nodules: large; biopsies have always been negative; TFTs are normal  Hiatal hernia: found on endoscopy (w/u for sleeve)  Obesity  Gallstones: diagnosed 2016  PCOS (polycystic ovarian syndrome)  No significant past surgical history    Allergies    No Known Allergies    Intolerances      MEDICATIONS  (STANDING):  heparin  Injectable 5000 Unit(s) SubCutaneous every 8 hours  hyoscyamine SL 0.125 milliGRAM(s) SubLingual every 6 hours  lactated ringers. 1000 milliLiter(s) (150 mL/Hr) IV Continuous <Continuous>  multivitamin/thiamine/folic acid in sodium chloride 0.9% 1000 milliLiter(s) (250 mL/Hr) IV Continuous <Continuous>  ondansetron Injectable 4 milliGRAM(s) IV Push every 6 hours  pantoprazole  Injectable 40 milliGRAM(s) IV Push every 24 hours    MEDICATIONS  (PRN):  benzocaine 15 mG/menthol 3.6 mG Lozenge 1 Lozenge Oral every 2 hours PRN Sore Throat  oxyCODONE    Solution 5 milliGRAM(s) Oral every 4 hours PRN Moderate Pain (4 - 6)      Social History: Denies smoking/Alcohol    Family history: No significant family hx.    ROS:   ENT: all negative except as noted in HPI   CV: denies palpitations  Pulm: denies SOB, cough, hemoptysis  GI: denies change in apetite, indigestion, n/v. Tolerating CLD  : denies pertinent urinary symptoms, urgency  Neuro: denies numbness/tingling, loss of sensation  Psych: denies anxiety  MS: denies muscle weakness, instability  Heme: denies easy bruising or bleeding  Endo: denies heat/cold intolerance, excessive sweating  Vascular: denies LE edema    Vital Signs Last 24 Hrs  T(C): 36.3 (19 Apr 2019 05:53), Max: 37.5 (18 Apr 2019 14:22)  T(F): 97.4 (19 Apr 2019 05:53), Max: 99.5 (18 Apr 2019 14:22)  HR: 74 (19 Apr 2019 05:53) (58 - 96)  BP: 110/74 (19 Apr 2019 05:53) (95/64 - 121/78)  BP(mean): --  RR: 18 (19 Apr 2019 05:53) (18 - 18)  SpO2: 97% (19 Apr 2019 05:53) (93% - 100%)                          12.1   11.6  )-----------( 171      ( 18 Apr 2019 07:25 )             36.5    04-18    137  |  105  |  <4<L>  ----------------------------<  80  3.8   |  21<L>  |  0.51    Ca    9.0      18 Apr 2019 07:20  Phos  2.8     04-18  Mg     2.1     04-18         PHYSICAL EXAM:  Gen: NAD  Skin: No rashes, bruises, or lesions  Head: Normocephalic, Atraumatic  Face: no edema, erythema, or fluctuance. Parotid glands soft without mass  Eyes: no scleral injection  Ears: Right - ear canal clear, TM intact without effusion or erythema. No evidence of any fluid drainage. No mastoid tenderness, erythema, or ear bulging            Left - ear canal clear, TM intact without effusion or erythema. No evidence of any fluid drainage. No mastoid tenderness, erythema, or ear bulging  Nose: Nares bilaterally patent, no discharge  Mouth: No Stridor / Drooling / Trismus.  Mucosa moist, tongue, oropharynx clear + elongated uvula with minimal swelling, no edema, minimal erythema. No ulcers, masses.   Neck: Flat, supple, no lymphadenopathy, trachea midline, no masses  Lymphatic: No lymphadenopathy  Resp: breathing easily, no stridor  CV: no peripheral edema/cyanosis  GI: nondistended   Peripheral vascular: no JVD or edema  Neuro: facial nerve intact, no facial droop

## 2019-04-19 NOTE — PROGRESS NOTE ADULT - ATTENDING COMMENTS
I saw and examined the patient, and reviewed  the history and data with the patient and staff  Agree with note which was also reviewed and edited where appropriate.  D/W patient, RN, residents and Fellow    Doing well.  Continue postop protocol.  Home when meets d/c criteria.
I saw and examined the patient, and reviewed  the history and data with the patient and staff  Agree with note which was also reviewed and edited where appropriate.  D/W patient, RN, residents and Fellow    Doing well.  Continue postop protocol.  Home when meets d/c criteria.

## 2019-04-19 NOTE — CONSULT NOTE ADULT - ASSESSMENT
33 year old female with morbid obesity and biliary colic s/p lap gastric sleeve and cholecystectomy POD # 2. We are asked to evaluate the patient secondary to complaints of sore throat.

## 2019-04-19 NOTE — DISCHARGE NOTE NURSING/CASE MANAGEMENT/SOCIAL WORK - NSDCDPATPORTLINK_GEN_ALL_CORE
You can access the [facility] Patient Portal, offered by [facility], by registering with the following website: [email or web address]

## 2019-04-19 NOTE — CONSULT NOTE ADULT - PROBLEM SELECTOR RECOMMENDATION 9
- NSAIDS for throat pain if no contraindication  - Tylenol OTC for pain control  - continue CLD as tolerated  - continue care per primary team  - please call ENT with questions - NSAIDS for throat pain if no contraindication  - Tylenol OTC for pain control  - continue CLD as tolerated  - continue care per primary team  - f/u Rapid Strep test  - please call ENT with questions

## 2019-04-19 NOTE — PROGRESS NOTE ADULT - ASSESSMENT
33 year old female with morbid obesity and biliary colic s/p lap gastric sleeve and cholecystectomy (4/17), recovering well.    - bariatric CLD  - pain control  - OOB/ambulate  - no Lvx on d/c per low risk stratification  - monitor for GI function  - Dispo once 8 point bariatric surgery criteria met    Green surg  [id number]

## 2019-04-19 NOTE — PROGRESS NOTE ADULT - SUBJECTIVE AND OBJECTIVE BOX
Bariatric Surgery Progress Note    Post Op Day#: 0  24 hr events/Subjective:   No acute issues, Patient seen and examined. Patient stable. Patient reports [place], but no vomiting. Patient denies cardiac chest pain, shortness of breadth, and palpitations. Patient denies pain.      Procedure:  Laparoscopic sleeve gastrectomy with cholecystectomy  Laparoscopic sleeve gastrectomy with cholecystectomy    Vital Signs Last 24 Hrs:  T(C): 36.7 (17 Apr 2019 13:30), Max: 37.2 (16 Apr 2019 19:58)  T(F): 98.1 (17 Apr 2019 13:30), Max: 99 (17 Apr 2019 06:49)  HR: 86 (17 Apr 2019 15:00) (70 - 101)  BP: 131/70 (17 Apr 2019 15:00) (112/66 - 147/71)  BP(mean): 94 (17 Apr 2019 14:30) (92 - 109)  RR: 18 (17 Apr 2019 15:00) (17 - 20)  SpO2: 100% (17 Apr 2019 15:00) (95% - 100%)  Height (cm): 157.48 (04-17 @ 06:49)  Weight (kg): 102.6 (04-17 @ 06:49)  BMI (kg/m2): 41.4 (04-17 @ 06:49)  BSA (m2): 2.01 (04-17 @ 06:49)    I&O's Summary:   17 Apr 2019 07:01  -  17 Apr 2019 15:40  --------------------------------------------------------  IN: 1080 mL / OUT: 600 mL / NET: 480 mL    I&O's Detail:   17 Apr 2019 07:01  -  17 Apr 2019 15:40  --------------------------------------------------------  IN:    IV PiggyBack: 100 mL    lactated ringers.: 300 mL    multivitamin/thiamine/folic acid in sodium chloride 0.9%: 680 mL  Total IN: 1080 mL  OUT:    Voided: 600 mL  Total OUT: 600 mL  Total NET: 480 mL      Physical Exam:  General:  Appears stated age, well-groomed, well-nourished, no distress  ENT: Erythema minimal, irritation noted on epiglottis however no edema present   Abdomen:  Soft, incisions clean, dry intact, tenderness around incisions, no rebound or guarding  No hematoma, no edema   Skin:  No rash   Neuro/Psych:  Alert, oriented to time, place and person         Labs:                      13.1   16.7  )-----------( 155      ( 17 Apr 2019 11:16 )             38.8   04-17  136  |  100  |  6<L>  ----------------------------<  143<H>  3.8   |  19<L>  |  0.61  Ca    8.8      17 Apr 2019 11:16  Phos  2.2     04-17  Mg     2.0     04-17      acetaminophen  IVPB .. milliGRAM(s) IV Intermittent once  ceFAZolin   IVPB milliGRAM(s) IV Intermittent once  ceFAZolin   IVPB milliGRAM(s) IV Intermittent every 8 hours  fentaNYL    Injectable MICROGram(s) IV Push every 5 minutes PRN  heparin  Injectable Unit(s) SubCutaneous every 8 hours  hyoscyamine SL milliGRAM(s) SubLingual every 6 hours  ketorolac   Injectable milliGRAM(s) IV Push every 6 hours  lactated ringers. milliLiter(s) IV Continuous <Continuous>  multivitamin/thiamine/folic acid in sodium chloride 0.9% milliLiter(s) IV Continuous <Continuous>  ondansetron Injectable milliGRAM(s) IV Push once PRN  ondansetron Injectable milliGRAM(s) IV Push every 6 hours  oxyCODONE    Solution milliGRAM(s) Oral every 4 hours PRN  pantoprazole  Injectable milliGRAM(s) IV Push every 24 hours  potassium phosphate IVPB milliMole(s) IV Intermittent once      Assessment and Plan:  33y y/o Female s/p Laparoscopic sleeve gastrectomy with cholecystectomy  POD #0    - Pain medication   - Continue ambulation   - Encourage Incentive Spirometer use  - Continue chemical and mechanical DVT prophylaxis  -Sepacol for irritated throat   - Discharge home  4/18 tentatively once tolerating adequate PO and 8 point discharge criteria met    Discuss with fellow   Tera Mccabe #[id number]
Post Op Day#: 1    Subjective: "Doing better, mild abdominal pain., no N/V/. Less discomfort at back of throat"    Objective: No acute events overnight. Continuous pulse oximetry at bedside functioning,  v/s stable, afebrile. Labs within acceptable range. Pt seen by ENT for complaints of swollen Uvula and scratchy throat this am.  Strep test negative. Pt cleared from ENT no further medical recommendations. Uvula appeared normal this morning without swelling or obvious trauma. Pt OOB and ambulating independently. She tolerated bariatric liquid diet, voiding.  Voiding.                                              Vital Signs Last 24 Hrs  T(C): 37 (19 Apr 2019 09:22), Max: 37.5 (18 Apr 2019 14:22)  T(F): 98.6 (19 Apr 2019 09:22), Max: 99.5 (18 Apr 2019 14:22)  HR: 77 (19 Apr 2019 09:22) (74 - 96)  BP: 120/80 (19 Apr 2019 09:22) (95/64 - 120/80)  BP(mean): --  RR: 18 (19 Apr 2019 09:22) (18 - 18)  SpO2: 100% (19 Apr 2019 09:22) (93% - 100%)                                               I&O's Summary    18 Apr 2019 07:01  -  19 Apr 2019 07:00  --------------------------------------------------------  IN: 3660 mL / OUT: 0 mL / NET: 3660 mL                                                                          12.1   11.6  )-----------( 171      ( 18 Apr 2019 07:25 )             36.5                                                 04-18    137  |  105  |  <4<L>  ----------------------------<  80  3.8   |  21<L>  |  0.51    Ca    9.0      18 Apr 2019 07:20  Phos  2.8     04-18  Mg     2.1     04-18      benzocaine 15 mG/menthol 3.6 mG Lozenge 1 Lozenge Oral every 2 hours PRN  heparin  Injectable 5000 Unit(s) SubCutaneous every 8 hours  hyoscyamine SL 0.125 milliGRAM(s) SubLingual every 6 hours  ondansetron Injectable 4 milliGRAM(s) IV Push every 6 hours  oxyCODONE    Solution 5 milliGRAM(s) Oral every 4 hours PRN  pantoprazole  Injectable 40 milliGRAM(s) IV Push every 24 hours      Physical Exam:         HEENT- Uvula intact, no swelling, mo ulcers noted       Lungs:  clear breath sounds b/l       Heart:  Regular rate & rhythm       Abdomen:  Soft, non-distended.  Scopes sites clean, dry and intact. + bs, - flatus, no rebound or guarding       Skin:  intact, pannus w/o rash       Extremities: + pulses, no edema, no calf tenderness, negative caterina's     VTE Risk Assessment Scores             Caprini VTE Risk Score:                                                                3-4( Mod), Perioperative and Postoperative VTE prophylaxis   [facility]  VTE Predicted RISK SCORE:  01% ( low ), No extended postoperative VTE prophylaxis      Assessment and Plan: s/P Lap [place], Sleeve Gastrectomy    - Bariatric Clear diet today then protocol derived staged meal progression supervised by RD in outpatient setting  - DVT/GI prophylaxis, Incentive spirometry  - Ambulate Q 2 hours or as indicated  -  Procedure specific education including diet, vitamins and VTE prevention. Written materials given  - Medication reviewed and reconciled, Bariatric meds obtained from [facility] prior to d/c  -  Met Bariatric 8-Point d/c criteria  -  Follow up with Dr Quintana in 7-10 days, Dietitian and PMD in 30 days.      Mireya Armendariz DNP, ANP  [phone]
Surgery Progress Note    S:     Patient seen and examined. No acute events overnight. Feeling well this morning, pain controlled, no nausea/vomiting, yet to pass flatus or BM. Tolerating bariatric CLD overnight.    O:    Vital Signs Last 24 Hrs  T(C): 37.3 (18 Apr 2019 01:15), Max: 37.3 (17 Apr 2019 21:18)  T(F): 99.2 (18 Apr 2019 01:15), Max: 99.2 (18 Apr 2019 01:15)  HR: 69 (18 Apr 2019 01:15) (69 - 101)  BP: 117/84 (18 Apr 2019 01:15) (109/80 - 147/71)  BP(mean): 97 (17 Apr 2019 17:00) (92 - 109)  RR: 18 (18 Apr 2019 01:15) (17 - 20)  SpO2: 98% (18 Apr 2019 01:15) (95% - 100%)    Physical Exam:  Gen: NAD  Resp: Unlabored breathing  Abd: soft, nondistended, appropriately tender, no rebound or guarding, incisions c/d/i  Ext: WWP  Skin: No rashes    I&O's Detail    17 Apr 2019 07:01  -  18 Apr 2019 04:07  --------------------------------------------------------  IN:    IV PiggyBack: 350 mL    lactated ringers.: 300 mL    multivitamin/thiamine/folic acid in sodium chloride 0.9%: 1680 mL  Total IN: 2330 mL    OUT:    Voided: 1200 mL  Total OUT: 1200 mL    Total NET: 1130 mL          MEDICATIONS  (STANDING):  heparin  Injectable 5000 Unit(s) SubCutaneous every 8 hours  hyoscyamine SL 0.125 milliGRAM(s) SubLingual every 6 hours  ketorolac   Injectable 30 milliGRAM(s) IV Push every 6 hours  lactated ringers. 1000 milliLiter(s) (150 mL/Hr) IV Continuous <Continuous>  multivitamin/thiamine/folic acid in sodium chloride 0.9% 1000 milliLiter(s) (250 mL/Hr) IV Continuous <Continuous>  ondansetron Injectable 4 milliGRAM(s) IV Push every 6 hours  pantoprazole  Injectable 40 milliGRAM(s) IV Push every 24 hours    MEDICATIONS  (PRN):  oxyCODONE    Solution 5 milliGRAM(s) Oral every 4 hours PRN Moderate Pain (4 - 6)      Labs:                          13.1   16.7  )-----------( 155      ( 17 Apr 2019 11:16 )             38.8       04-17    136  |  100  |  6<L>  ----------------------------<  143<H>  3.8   |  19<L>  |  0.61    Ca    8.8      17 Apr 2019 11:16  Phos  2.2     04-17  Mg     2.0     04-17        Radiology:  no new imaging
Surgery Progress Note    S:     Patient seen and examined. No acute events overnight. Patient very concerned about uvula yesterday, examined by anesthesia, likely regular postintubation change. Otherwise doing well, pain controlled, tolerating diet, ambulating without difficulty.      O:    Vital Signs Last 24 Hrs  T(C): 36.3 (19 Apr 2019 00:38), Max: 37.5 (18 Apr 2019 14:22)  T(F): 97.4 (19 Apr 2019 00:38), Max: 99.5 (18 Apr 2019 14:22)  HR: 75 (19 Apr 2019 00:38) (58 - 96)  BP: 96/69 (19 Apr 2019 00:38) (95/64 - 121/78)  BP(mean): --  RR: 18 (19 Apr 2019 00:38) (18 - 18)  SpO2: 95% (19 Apr 2019 00:38) (93% - 100%)    Physical Exam:  Gen: NAD  Resp: Unlabored breathing  Abd: soft, NTND, no rebound or guarding  Ext: WWP  Skin: No rashes    I&O's Detail    17 Apr 2019 07:01  -  18 Apr 2019 07:00  --------------------------------------------------------  IN:    IV PiggyBack: 350 mL    lactated ringers.: 2100 mL    multivitamin/thiamine/folic acid in sodium chloride 0.9%: 1680 mL  Total IN: 4130 mL    OUT:    Voided: 1200 mL  Total OUT: 1200 mL    Total NET: 2930 mL      18 Apr 2019 07:01  -  19 Apr 2019 05:22  --------------------------------------------------------  IN:    lactated ringers.: 1800 mL    Oral Fluid: 60 mL  Total IN: 1860 mL    OUT:  Total OUT: 0 mL    Total NET: 1860 mL          MEDICATIONS  (STANDING):  heparin  Injectable 5000 Unit(s) SubCutaneous every 8 hours  hyoscyamine SL 0.125 milliGRAM(s) SubLingual every 6 hours  lactated ringers. 1000 milliLiter(s) (150 mL/Hr) IV Continuous <Continuous>  multivitamin/thiamine/folic acid in sodium chloride 0.9% 1000 milliLiter(s) (250 mL/Hr) IV Continuous <Continuous>  ondansetron Injectable 4 milliGRAM(s) IV Push every 6 hours  pantoprazole  Injectable 40 milliGRAM(s) IV Push every 24 hours    MEDICATIONS  (PRN):  benzocaine 15 mG/menthol 3.6 mG Lozenge 1 Lozenge Oral every 2 hours PRN Sore Throat  oxyCODONE    Solution 5 milliGRAM(s) Oral every 4 hours PRN Moderate Pain (4 - 6)      Labs:                          12.1   11.6  )-----------( 171      ( 18 Apr 2019 07:25 )             36.5       04-18    137  |  105  |  <4<L>  ----------------------------<  80  3.8   |  21<L>  |  0.51    Ca    9.0      18 Apr 2019 07:20  Phos  2.8     04-18  Mg     2.1     04-18        Radiology:  no new imaging

## 2019-04-20 LAB — HETEROPH AB TITR SER AGGL: NEGATIVE — SIGNIFICANT CHANGE UP

## 2019-04-21 LAB
CULTURE RESULTS: SIGNIFICANT CHANGE UP
SPECIMEN SOURCE: SIGNIFICANT CHANGE UP

## 2019-04-25 LAB — SURGICAL PATHOLOGY STUDY: SIGNIFICANT CHANGE UP

## 2019-04-30 ENCOUNTER — APPOINTMENT (OUTPATIENT)
Dept: SURGERY | Facility: CLINIC | Age: 34
End: 2019-04-30
Payer: COMMERCIAL

## 2019-04-30 VITALS
WEIGHT: 214 LBS | OXYGEN SATURATION: 100 % | TEMPERATURE: 98.8 F | BODY MASS INDEX: 39.38 KG/M2 | DIASTOLIC BLOOD PRESSURE: 74 MMHG | HEIGHT: 62 IN | SYSTOLIC BLOOD PRESSURE: 106 MMHG | RESPIRATION RATE: 20 BRPM | HEART RATE: 104 BPM

## 2019-04-30 PROCEDURE — 99024 POSTOP FOLLOW-UP VISIT: CPT

## 2019-06-11 ENCOUNTER — APPOINTMENT (OUTPATIENT)
Dept: SURGERY | Facility: CLINIC | Age: 34
End: 2019-06-11
Payer: COMMERCIAL

## 2019-06-11 VITALS — HEIGHT: 62 IN | WEIGHT: 201.5 LBS | BODY MASS INDEX: 37.08 KG/M2

## 2019-06-11 VITALS
TEMPERATURE: 98.6 F | HEART RATE: 83 BPM | RESPIRATION RATE: 20 BRPM | DIASTOLIC BLOOD PRESSURE: 80 MMHG | SYSTOLIC BLOOD PRESSURE: 117 MMHG | OXYGEN SATURATION: 99 %

## 2019-06-11 PROCEDURE — 99024 POSTOP FOLLOW-UP VISIT: CPT

## 2019-07-15 ENCOUNTER — APPOINTMENT (OUTPATIENT)
Dept: ENDOCRINOLOGY | Facility: CLINIC | Age: 34
End: 2019-07-15
Payer: COMMERCIAL

## 2019-07-15 VITALS
HEIGHT: 62 IN | SYSTOLIC BLOOD PRESSURE: 115 MMHG | WEIGHT: 190 LBS | DIASTOLIC BLOOD PRESSURE: 65 MMHG | TEMPERATURE: 98.5 F | BODY MASS INDEX: 34.96 KG/M2 | HEART RATE: 63 BPM | OXYGEN SATURATION: 99 %

## 2019-07-15 PROCEDURE — 36415 COLL VENOUS BLD VENIPUNCTURE: CPT

## 2019-07-15 PROCEDURE — 76536 US EXAM OF HEAD AND NECK: CPT

## 2019-07-15 PROCEDURE — 99214 OFFICE O/P EST MOD 30 MIN: CPT | Mod: 25

## 2019-07-15 NOTE — PROCEDURE
[[employer] e 2008 model, 10-12 MHz frequencies] : multiple real time longitudinal and transverse images were obtained using a high resolution ultrasound with a linear transducer, [employer] e 2008 model, 10-12 MHz frequencies. All measurements will be reported as longitudinal x oh-posterior x transverse. [Multinodular Goiter] : multinodular goiter [] : a heterogeneous parenchyma  [Solid] : solid [No] : does not have a halo [Right Thyroid] : right [Upper] : upper pole there is a  [Heterogeneous] : heterogenous nodule [Round] : round in shape [No calcifications] : no calcifications [Left Thyroid] : left [Mid] : mid pole there is a  [Mixed] : mixed [Hypoechoic] : hypoechoic nodule [Ovoid] : ovoid in shape [Smooth] : smooth [Regular] : regular [Microcalcifications] : microcalcifications [Peripheral vascularity] : peripheral vascularity [FreeTextEntry1] : 2.98 x 1.52 x 2.43 [FreeTextEntry5] : 3.20 x 1.39 x 1.80 [FreeTextEntry2] : 0.35 [FreeTextEntry3] : 0.93 x 0.63 x 0.87

## 2019-07-15 NOTE — IMPRESSION
[FreeTextEntry1] : MNG with hx of fna left nodule and from old records several nodules in rt lobe in past now less so and likely smaller. Left dom nodule is smalaer than that from 2015. Discussed and will reassess in 9-12 months.

## 2019-07-15 NOTE — PROCEDURE
[[employer] e 2008 model, 10-12 MHz frequencies] : multiple real time longitudinal and transverse images were obtained using a high resolution ultrasound with a linear transducer, [employer] e 2008 model, 10-12 MHz frequencies. All measurements will be reported as longitudinal x oh-posterior x transverse. [Multinodular Goiter] : multinodular goiter [] : a heterogeneous parenchyma  [Solid] : solid [No] : does not have a halo [Right Thyroid] : right [Upper] : upper pole there is a  [Heterogeneous] : heterogenous nodule [Round] : round in shape [No calcifications] : no calcifications [Left Thyroid] : left [Mid] : mid pole there is a  [Mixed] : mixed [Hypoechoic] : hypoechoic nodule [Ovoid] : ovoid in shape [Smooth] : smooth [Regular] : regular [Microcalcifications] : microcalcifications [Peripheral vascularity] : peripheral vascularity [FreeTextEntry1] : 2.98 x 1.52 x 2.43 [FreeTextEntry5] : 3.20 x 1.39 x 1.80 [FreeTextEntry2] : 0.35 [FreeTextEntry3] : 0.93 x 0.63 x 0.87 40.6

## 2019-07-15 NOTE — PROCEDURE
[[employer] e 2008 model, 10-12 MHz frequencies] : multiple real time longitudinal and transverse images were obtained using a high resolution ultrasound with a linear transducer, [employer] e 2008 model, 10-12 MHz frequencies. All measurements will be reported as longitudinal x oh-posterior x transverse. [Multinodular Goiter] : multinodular goiter [] : a heterogeneous parenchyma  [Solid] : solid [No] : does not have a halo [Right Thyroid] : right [Upper] : upper pole there is a  [Heterogeneous] : heterogenous nodule [Round] : round in shape [No calcifications] : no calcifications [Left Thyroid] : left [Mid] : mid pole there is a  [Mixed] : mixed [Hypoechoic] : hypoechoic nodule [Ovoid] : ovoid in shape [Smooth] : smooth [Regular] : regular [Microcalcifications] : microcalcifications [Peripheral vascularity] : peripheral vascularity [FreeTextEntry1] : 2.98 x 1.52 x 2.43 [FreeTextEntry5] : 3.20 x 1.39 x 1.80 [FreeTextEntry2] : 0.35 [FreeTextEntry3] : 0.93 x 0.63 x 0.87

## 2019-07-25 LAB
25(OH)D3 SERPL-MCNC: 24.1 NG/ML
ALBUMIN SERPL ELPH-MCNC: 4.6 G/DL
ALP BLD-CCNC: 77 U/L
ALT SERPL-CCNC: 17 U/L
ANION GAP SERPL CALC-SCNC: 13 MMOL/L
AST SERPL-CCNC: 14 U/L
BASOPHILS # BLD AUTO: 0.04 K/UL
BASOPHILS NFR BLD AUTO: 0.4 %
BILIRUB SERPL-MCNC: 0.5 MG/DL
BUN SERPL-MCNC: 11 MG/DL
CALCIUM SERPL-MCNC: 10.2 MG/DL
CHLORIDE SERPL-SCNC: 100 MMOL/L
CO2 SERPL-SCNC: 26 MMOL/L
CREAT SERPL-MCNC: 0.66 MG/DL
DHEA-S SERPL-MCNC: 110 UG/DL
EOSINOPHIL # BLD AUTO: 0.07 K/UL
EOSINOPHIL NFR BLD AUTO: 0.6 %
ESTIMATED AVERAGE GLUCOSE: 100 MG/DL
ESTROGEN SERPL-MCNC: 318 PG/ML
FERRITIN SERPL-MCNC: 50 NG/ML
GLUCOSE SERPL-MCNC: 81 MG/DL
HBA1C MFR BLD HPLC: 5.1 %
HCT VFR BLD CALC: 42.5 %
HGB BLD-MCNC: 13.5 G/DL
IMM GRANULOCYTES NFR BLD AUTO: 0.4 %
INSULIN SERPL-MCNC: 11.3 UU/ML
LYMPHOCYTES # BLD AUTO: 3.38 K/UL
LYMPHOCYTES NFR BLD AUTO: 31.2 %
MAN DIFF?: NORMAL
MCHC RBC-ENTMCNC: 29.2 PG
MCHC RBC-ENTMCNC: 31.8 GM/DL
MCV RBC AUTO: 91.8 FL
MONOCYTES # BLD AUTO: 0.57 K/UL
MONOCYTES NFR BLD AUTO: 5.3 %
NEUTROPHILS # BLD AUTO: 6.72 K/UL
NEUTROPHILS NFR BLD AUTO: 62.1 %
PLATELET # BLD AUTO: 192 K/UL
POTASSIUM SERPL-SCNC: 4.4 MMOL/L
PROGEST SERPL-MCNC: 6.4 NG/ML
PROT SERPL-MCNC: 7.4 G/DL
RBC # BLD: 4.63 M/UL
RBC # FLD: 13.9 %
SHBG SERPL-SCNC: 38 NMOL/L
SODIUM SERPL-SCNC: 139 MMOL/L
T3FREE SERPL-MCNC: 2.81 PG/ML
T4 FREE SERPL-MCNC: 1.1 NG/DL
TESTOST BND SERPL-MCNC: 2 PG/ML
TESTOST SERPL-MCNC: 40.7 NG/DL
THYROGLOB AB SERPL-ACNC: <20 IU/ML
THYROPEROXIDASE AB SERPL IA-ACNC: <10 IU/ML
TSH SERPL-ACNC: 0.73 UIU/ML
VIT B12 SERPL-MCNC: 631 PG/ML
WBC # FLD AUTO: 10.82 K/UL

## 2019-07-29 NOTE — HISTORY OF PRESENT ILLNESS
[FreeTextEntry1] : Ms. BRAVO  is a 33 year year old female who returns today for endocrine reevaluation.  Patient returns with regard to  a history of  pcos.  Additional history includes that of  thyroid nodularity, large-had fna in past at [facility] [place].  Does note then when she extends her neck, she feels some discomfort.\par Had fna 6/11/2015 at [facility] [place] FNA done on a left midpole 1.4 cm nodule with  results c/w a benign nodualr goiter.\par Did have gastric sleeve procedure with Dr. Hernandez on 4/17 of this year and has lost 50 lbs to date.\par \par The pcos did manifest in the pst as irreg cycles but did change to every 5-6 weeks-then became preg now ahs 2 yr old girl. had been on metfromin  leading up to the preg. Followed by Dr. Gilberto Salvador.  Never went back on metformin psot preg.\par In past did have hair growth-wtih years of laser hiar removal.  Had midl acne in pst now better and had thinning hair.\par menses since sleeve procedure more regular.\par Is on bioyin and a multivit.\par hair loss did worsen post  surg\par Aunts/uncles with type 2 but neither parent with dm.\par PMD  Dr Cox in [place]\par \par She feels thyroid is more prominent of late.

## 2019-07-29 NOTE — PHYSICAL EXAM
[Alert] : alert [No Acute Distress] : no acute distress [Well Nourished] : well nourished [Well Developed] : well developed [Normal Sclera/Conjunctiva] : normal sclera/conjunctiva [EOMI] : extra ocular movement intact [No Proptosis] : no proptosis [Normal Oropharynx] : the oropharynx was normal [No Respiratory Distress] : no respiratory distress [No Accessory Muscle Use] : no accessory muscle use [Clear to Auscultation] : lungs were clear to auscultation bilaterally [Normal Rate] : heart rate was normal  [Normal S1, S2] : normal S1 and S2 [Regular Rhythm] : with a regular rhythm [Pedal Pulses Normal] : the pedal pulses are present [No Edema] : there was no peripheral edema [Normal Bowel Sounds] : normal bowel sounds [Not Tender] : non-tender [Soft] : abdomen soft [Not Distended] : not distended [Post Cervical Nodes] : posterior cervical nodes [Anterior Cervical Nodes] : anterior cervical nodes [Axillary Nodes] : axillary nodes [Normal] : normal and non tender [No Spinal Tenderness] : no spinal tenderness [Spine Straight] : spine straight [No Stigmata of Cushings Syndrome] : no stigmata of cushings syndrome [Normal Gait] : normal gait [Normal Strength/Tone] : muscle strength and tone were normal [No Rash] : no rash [Normal Reflexes] : deep tendon reflexes were 2+ and symmetric [No Tremors] : no tremors [Oriented x3] : oriented to person, place, and time [de-identified] : TThyropdi full, heterogenous and firm in midline [Acanthosis Nigricans] : no acanthosis nigricans

## 2019-07-29 NOTE — HISTORY OF PRESENT ILLNESS
[FreeTextEntry1] : Ms. BRAVO  is a 33 year year old female who returns today for endocrine reevaluation.  Patient returns with regard to  a history of  pcos.  Additional history includes that of  thyroid nodularity, large-had fna in past at [facility].  Does note then when she extends her neck, she feels some discomfort.\par Had fna 6/11/2015 at [facility] FNA done on a left midpole 1.4 cm nodule with  results c/w a benign nodualr goiter.\par Did have gastric sleeve procedure with Dr. Hernandez on 4/17 of this year and has lost 50 lbs to date.\par \par The pcos did manifest in the pst as irreg cycles but did change to every 5-6 weeks-then became preg now ahs 2 yr old girl. had been on metfromin  leading up to the preg. Followed by Dr. Gilberto Salvador.  Never went back on metformin psot preg.\par In past did have hair growth-wtih years of laser hiar removal.  Had midl acne in pst now better and had thinning hair.\par menses since sleeve procedure more regular.\par Is on bioyin and a multivit.\par hair loss did worsen post  surg\par Aunts/uncles with type 2 but neither parent with dm.\par PMD  Dr Cox in [place]\par \par She feels thyroid is more prominent of late.

## 2019-07-29 NOTE — HISTORY OF PRESENT ILLNESS
[FreeTextEntry1] : Ms. BRAVO  is a 33 year year old female who returns today for endocrine reevaluation.  Patient returns with regard to  a history of  pcos.  Additional history includes that of  thyroid nodularity, large-had fna in past at [facility].  Does note then when she extends her neck, she feels some discomfort.\par Had fna 6/11/2015 at [facility] FNA done on a left midpole 1.4 cm nodule with  results c/w a benign nodualr goiter.\par Did have gastric sleeve procedure with Dr. Hernandez on 4/17 of this year and has lost 50 lbs to date.\par \par The pcos did manifest in the pst as irreg cycles but did change to every 5-6 weeks-then became preg now ahs 2 yr old girl. had been on metfromin  leading up to the preg. Followed by Dr. Gilberto Salvador.  Never went back on metformin psot preg.\par In past did have hair growth-wtih years of laser hiar removal.  Had midl acne in pst now better and had thinning hair.\par menses since sleeve procedure more regular.\par Is on bioyin and a multivit.\par hair loss did worsen post  surg\par Aunts/uncles with type 2 but neither parent with dm.\par PMD  Dr Cox in [place]\par \par She feels thyroid is more prominent of late.

## 2019-07-29 NOTE — PHYSICAL EXAM
[Alert] : alert [No Acute Distress] : no acute distress [Well Nourished] : well nourished [Well Developed] : well developed [Normal Sclera/Conjunctiva] : normal sclera/conjunctiva [EOMI] : extra ocular movement intact [No Proptosis] : no proptosis [Normal Oropharynx] : the oropharynx was normal [No Respiratory Distress] : no respiratory distress [No Accessory Muscle Use] : no accessory muscle use [Clear to Auscultation] : lungs were clear to auscultation bilaterally [Normal Rate] : heart rate was normal  [Normal S1, S2] : normal S1 and S2 [Regular Rhythm] : with a regular rhythm [Pedal Pulses Normal] : the pedal pulses are present [No Edema] : there was no peripheral edema [Normal Bowel Sounds] : normal bowel sounds [Not Tender] : non-tender [Soft] : abdomen soft [Not Distended] : not distended [Post Cervical Nodes] : posterior cervical nodes [Anterior Cervical Nodes] : anterior cervical nodes [Axillary Nodes] : axillary nodes [Normal] : normal and non tender [No Spinal Tenderness] : no spinal tenderness [Spine Straight] : spine straight [No Stigmata of Cushings Syndrome] : no stigmata of cushings syndrome [Normal Gait] : normal gait [Normal Strength/Tone] : muscle strength and tone were normal [No Rash] : no rash [Normal Reflexes] : deep tendon reflexes were 2+ and symmetric [No Tremors] : no tremors [Oriented x3] : oriented to person, place, and time [de-identified] : TThyropdi full, heterogenous and firm in midline [Acanthosis Nigricans] : no acanthosis nigricans

## 2019-07-29 NOTE — PHYSICAL EXAM
[Alert] : alert [No Acute Distress] : no acute distress [Well Nourished] : well nourished [Well Developed] : well developed [Normal Sclera/Conjunctiva] : normal sclera/conjunctiva [EOMI] : extra ocular movement intact [No Proptosis] : no proptosis [Normal Oropharynx] : the oropharynx was normal [No Respiratory Distress] : no respiratory distress [No Accessory Muscle Use] : no accessory muscle use [Clear to Auscultation] : lungs were clear to auscultation bilaterally [Normal Rate] : heart rate was normal  [Normal S1, S2] : normal S1 and S2 [Regular Rhythm] : with a regular rhythm [Pedal Pulses Normal] : the pedal pulses are present [No Edema] : there was no peripheral edema [Normal Bowel Sounds] : normal bowel sounds [Not Tender] : non-tender [Soft] : abdomen soft [Not Distended] : not distended [Post Cervical Nodes] : posterior cervical nodes [Anterior Cervical Nodes] : anterior cervical nodes [Axillary Nodes] : axillary nodes [Normal] : normal and non tender [No Spinal Tenderness] : no spinal tenderness [Spine Straight] : spine straight [No Stigmata of Cushings Syndrome] : no stigmata of cushings syndrome [Normal Gait] : normal gait [Normal Strength/Tone] : muscle strength and tone were normal [No Rash] : no rash [Normal Reflexes] : deep tendon reflexes were 2+ and symmetric [No Tremors] : no tremors [Oriented x3] : oriented to person, place, and time [Acanthosis Nigricans] : no acanthosis nigricans [de-identified] : TThyropdi full, heterogenous and firm in midline

## 2019-09-17 ENCOUNTER — APPOINTMENT (OUTPATIENT)
Dept: SURGERY | Facility: CLINIC | Age: 34
End: 2019-09-17
Payer: COMMERCIAL

## 2019-09-17 VITALS
RESPIRATION RATE: 16 BRPM | OXYGEN SATURATION: 98 % | WEIGHT: 180 LBS | DIASTOLIC BLOOD PRESSURE: 71 MMHG | TEMPERATURE: 98.7 F | HEART RATE: 83 BPM | BODY MASS INDEX: 33.13 KG/M2 | SYSTOLIC BLOOD PRESSURE: 107 MMHG | HEIGHT: 62 IN

## 2019-09-17 DIAGNOSIS — K21.9 GASTRO-ESOPHAGEAL REFLUX DISEASE W/OUT ESOPHAGITIS: ICD-10-CM

## 2019-09-17 PROCEDURE — 99213 OFFICE O/P EST LOW 20 MIN: CPT

## 2019-09-17 RX ORDER — BIOTIN 10 MG
TABLET ORAL
Refills: 0 | Status: ACTIVE | COMMUNITY

## 2019-09-17 RX ORDER — MV-MIN/FOLIC/VIT K/LYCOP/COQ10 200-100MCG
CAPSULE ORAL
Refills: 0 | Status: ACTIVE | COMMUNITY

## 2020-04-14 ENCOUNTER — RESULT REVIEW (OUTPATIENT)
Age: 35
End: 2020-04-14

## 2020-05-14 PROBLEM — Z01.818 PREOPERATIVE EXAMINATION: Status: RESOLVED | Noted: 2018-11-08 | Resolved: 2020-05-14

## 2020-05-15 ENCOUNTER — APPOINTMENT (OUTPATIENT)
Dept: SURGERY | Facility: CLINIC | Age: 35
End: 2020-05-15
Payer: COMMERCIAL

## 2020-05-15 DIAGNOSIS — Z01.818 ENCOUNTER FOR OTHER PREPROCEDURAL EXAMINATION: ICD-10-CM

## 2020-05-15 PROCEDURE — 99213 OFFICE O/P EST LOW 20 MIN: CPT | Mod: 95

## 2020-05-15 NOTE — HISTORY OF PRESENT ILLNESS
[Home] : at home, [unfilled] , at the time of the visit. [Medical Office: ([facility])___] : at the medical office located in  [Self] : self [Patient] : the patient [de-identified] : Luz is a 34 year old female status post laparoscopic sleeve gastrectomy on 9/17/19. \par \par She has lost about 85 lbs since surgery and feels well.  Reports no dysphagia, vomiting, or reflux.  Feels she could do better in food choices, but overall has no complaints.  Compliant with MVI patches.

## 2020-05-15 NOTE — PLAN
[FreeTextEntry1] : [] nutritional labs ordered\par [] rec f/u with endocrinologist\par [] discussed low carb, high protein diet and regular exercise\par [] f/u 3-6 months

## 2020-05-15 NOTE — ASSESSMENT
[FreeTextEntry1] : 34F doing well 1 year s/p laparospic sleeve gastrectomy by Dr. Quintana.\par Concerned about recent fatigue; hx of hypothyroidism.

## 2020-06-18 ENCOUNTER — APPOINTMENT (OUTPATIENT)
Dept: SURGERY | Facility: CLINIC | Age: 35
End: 2020-06-18
Payer: COMMERCIAL

## 2020-06-18 ENCOUNTER — TRANSCRIPTION ENCOUNTER (OUTPATIENT)
Age: 35
End: 2020-06-18

## 2020-06-18 VITALS
HEIGHT: 62 IN | OXYGEN SATURATION: 100 % | TEMPERATURE: 98.1 F | HEART RATE: 65 BPM | DIASTOLIC BLOOD PRESSURE: 80 MMHG | BODY MASS INDEX: 29.77 KG/M2 | SYSTOLIC BLOOD PRESSURE: 123 MMHG | WEIGHT: 161.8 LBS

## 2020-06-18 DIAGNOSIS — E66.01 MORBID (SEVERE) OBESITY DUE TO EXCESS CALORIES: ICD-10-CM

## 2020-06-18 DIAGNOSIS — L98.7 EXCESSIVE AND REDUNDANT SKIN AND SUBCUTANEOUS TISSUE: ICD-10-CM

## 2020-06-18 DIAGNOSIS — Z98.84 BARIATRIC SURGERY STATUS: ICD-10-CM

## 2020-06-18 LAB
BASOPHILS # BLD AUTO: 0.03 K/UL
BASOPHILS NFR BLD AUTO: 0.4 %
EOSINOPHIL # BLD AUTO: 0.05 K/UL
EOSINOPHIL NFR BLD AUTO: 0.7 %
HCT VFR BLD CALC: 39.2 %
HGB BLD-MCNC: 12.7 G/DL
IMM GRANULOCYTES NFR BLD AUTO: 0.1 %
LYMPHOCYTES # BLD AUTO: 2.72 K/UL
LYMPHOCYTES NFR BLD AUTO: 39 %
MAN DIFF?: NORMAL
MCHC RBC-ENTMCNC: 29.6 PG
MCHC RBC-ENTMCNC: 32.4 GM/DL
MCV RBC AUTO: 91.4 FL
MONOCYTES # BLD AUTO: 0.44 K/UL
MONOCYTES NFR BLD AUTO: 6.3 %
NEUTROPHILS # BLD AUTO: 3.73 K/UL
NEUTROPHILS NFR BLD AUTO: 53.5 %
PLATELET # BLD AUTO: 186 K/UL
RBC # BLD: 4.29 M/UL
RBC # FLD: 12.9 %
WBC # FLD AUTO: 6.98 K/UL

## 2020-06-18 PROCEDURE — 99213 OFFICE O/P EST LOW 20 MIN: CPT

## 2020-06-18 NOTE — ASSESSMENT
[___ Years Post Op] : [unfilled] years [de-identified] : Doing well except for abdominal pannus and periumbilical rash and irritation / ulceration.\par Would benefit from abdominoplasty.\par Plan:\par Plastic surgery evaluation\par Check labs\par Continue post bariatric diet\par Follow-up 1 year

## 2020-06-18 NOTE — PHYSICAL EXAM
[de-identified] : Anicteric [de-identified] : Soft nontender [de-identified] : Healed [de-identified] : Moist, grade 1-2 irritation/redness [de-identified] : None

## 2020-06-18 NOTE — REASON FOR VISIT
[de-identified] : 4/17/19 [DOLV: ___] : DOLV [unfilled] [de-identified] : 35 y/o female s/p laparoscopic cholecystectomy and vertical sleeve gastrectomy on 4/17/2019. Patient had a telehealth appointment with Dr. Villa on 5/15/2020, reported weight 180 lbs / BMI 32.9.

## 2020-06-18 NOTE — HISTORY OF PRESENT ILLNESS
[___ Years Post Op] : [unfilled] years [Pre-Op Weight ___] : Pre-op weight was [unfilled] lbs [de-identified] : Patient complaining of frequent rashes, worse in umbilical region and under pannus.\par Patient saw PCP who thought she might benefit from an abdominal plasty.\par Patient here for follow-up and advice regarding same [Phase 4] : Phase 4 [___Kcal] : [unfilled] Kcal [Walking] : walking [Hypertension] : no Hypertension [Diabetes] : no Diabetes [Sleep Apnea] : no Sleep Apnea [GERD] : no GERD [Hyperlipidemia] : no Hyperlipidemia

## 2020-06-18 NOTE — REVIEW OF SYSTEMS
[Patient Intake Form Reviewed] : Patient intake form was reviewed [Skin Rash] : skin rash [Negative] : Psychiatric

## 2020-06-19 LAB
25(OH)D3 SERPL-MCNC: 22 NG/ML
ALBUMIN SERPL ELPH-MCNC: 4.5 G/DL
ALP BLD-CCNC: 62 U/L
ALT SERPL-CCNC: 11 U/L
ANION GAP SERPL CALC-SCNC: 11 MMOL/L
AST SERPL-CCNC: 12 U/L
BILIRUB SERPL-MCNC: 1.1 MG/DL
BUN SERPL-MCNC: 12 MG/DL
CALCIUM SERPL-MCNC: 9.6 MG/DL
CHLORIDE SERPL-SCNC: 102 MMOL/L
CO2 SERPL-SCNC: 25 MMOL/L
CREAT SERPL-MCNC: 0.76 MG/DL
FOLATE SERPL-MCNC: 6.4 NG/ML
GLUCOSE SERPL-MCNC: 85 MG/DL
IRON SERPL-MCNC: 96 UG/DL
POTASSIUM SERPL-SCNC: 4.5 MMOL/L
PROT SERPL-MCNC: 6.9 G/DL
SODIUM SERPL-SCNC: 138 MMOL/L
VIT B12 SERPL-MCNC: 731 PG/ML

## 2020-08-04 ENCOUNTER — EMERGENCY (EMERGENCY)
Facility: HOSPITAL | Age: 35
LOS: 1 days | Discharge: HOME | End: 2020-08-04
Attending: EMERGENCY MEDICINE | Admitting: EMERGENCY MEDICINE
Payer: COMMERCIAL

## 2020-08-04 VITALS
DIASTOLIC BLOOD PRESSURE: 87 MMHG | RESPIRATION RATE: 18 BRPM | SYSTOLIC BLOOD PRESSURE: 108 MMHG | HEART RATE: 70 BPM | OXYGEN SATURATION: 100 %

## 2020-08-04 LAB
APTT BLD: 31.4 SEC — SIGNIFICANT CHANGE UP (ref 27–36.3)
INR BLD: 1.06 — SIGNIFICANT CHANGE UP (ref 0.88–1.17)
PROTHROM AB SERPL-ACNC: 12.2 SEC — SIGNIFICANT CHANGE UP (ref 10.6–13.6)

## 2020-08-04 PROCEDURE — 99283 EMERGENCY DEPT VISIT LOW MDM: CPT

## 2020-08-04 NOTE — ED PROVIDER NOTE - PMH
Gallstones  diagnosed 2016  Hiatal hernia  found on endoscopy (w/u for sleeve)  Lumbago    Multiple thyroid nodules  large; biopsies have always been negative; TFTs are normal  Obesity    PCOS (polycystic ovarian syndrome)

## 2020-08-04 NOTE — ED ADULT TRIAGE NOTE - CHIEF COMPLAINT QUOTE
A&Ox3 sent in by MD for elevated INR 15, pt denies any use of blood thinners, no medical problems no bruising or bleeding noted

## 2020-08-04 NOTE — ED PROVIDER NOTE - ATTENDING CONTRIBUTION TO CARE
Attending note:   After face to face evaluation of this patient, I concur with above noted hx, pe, and care plan for this patient.  Clancy: 35 yof with sent to ED for elevated INR at pre-op appt yesterday. Pt to have abdominoplasty next week. No bleeding or bruising concerns. On exam, pt is well appearing, no bleeding noted on exam. Most likely cause of elevated INR for someone not on any supplements or meds is lab error, tube not full etc. Will repeat first as pt is low risk on bleeding.

## 2020-08-04 NOTE — ED PROVIDER NOTE - NSTIMEPROVIDERCAREINITIATE_GEN_ER
04-Aug-2020 11:07 Propranolol Counseling:  I discussed with the patient the risks of propranolol including but not limited to low heart rate, low blood pressure, low blood sugar, restlessness and increased cold sensitivity. They should call the office if they experience any of these side effects.

## 2020-08-04 NOTE — ED PROVIDER NOTE - PATIENT PORTAL LINK FT
You can access the FollowMyHealth Patient Portal offered by [facility] by registering at the following website: [email or web address]. By joining [employer]’s FollowMyHealth portal, you will also be able to view your health information using other applications (apps) compatible with our system.

## 2020-08-04 NOTE — ED PROVIDER NOTE - OBJECTIVE STATEMENT
36 yo F no significant PMH presents to ED following abnormal pre-operative lab value, as per patient her PT/INR was 15?. Labs were completed earlier this week, reviewing physician referred to ED for further work up however patient did not bring results to ED and does not have a record to present. Pt is scheduled for an abdominoplasty w. Dr. Jorge HANSEN Denies any toxic ingestion. Patient is not on blood thinner and denies any history of excessive bleeding, bruising, joint swelling, petichiae, or gum fragility. Pt denies feeling fatigued/weak/fainting. No history of etOH, tobacco, or other illicit substance abuse. Pt currently takes only multivitamins, no other illicit drug use. Fam hx negative for bleeding disorder, cancer, or heart disease.   Pt. has h/o gastric sleeve surgery last year without complication, 2 previous pregnancies with vaginal birth, no complications.

## 2020-08-04 NOTE — ED PROVIDER NOTE - NSFOLLOWUPINSTRUCTIONS_ED_ALL_ED_FT
You were seen and evaluated today for a reported abnormal lab value. Our work-up today included a repeat of your PT/PTT/INR the results of which have been explained to you and provided to you seperately. Please keep a copy of these results to present to doctor in future visits.     - Present these results at your next pre-operative appointment  - You are safe for discharge home    Please return to the Emergency Department should you experience any of the following:  - Repeated abnormal lab values  - excessive bleeding/bruising/rash  - weakness/fainting/easy fatigue  - Any new concerning symptoms

## 2020-08-08 ENCOUNTER — TRANSCRIPTION ENCOUNTER (OUTPATIENT)
Age: 35
End: 2020-08-08

## 2020-12-15 PROBLEM — Z87.09 HISTORY OF ACUTE PHARYNGITIS: Status: RESOLVED | Noted: 2017-01-11 | Resolved: 2020-12-15

## 2021-03-22 ENCOUNTER — TRANSCRIPTION ENCOUNTER (OUTPATIENT)
Age: 36
End: 2021-03-22

## 2022-02-03 ENCOUNTER — RESULT REVIEW (OUTPATIENT)
Age: 37
End: 2022-02-03

## 2022-04-12 NOTE — PATIENT PROFILE OB - FUNCTIONAL SCREEN CURRENT LEVEL: COMMUNICATION, MLM
Patient called wanting his note for work to state that he could be allowed back at work on the 13th.  Thelma Combs on 4/11/2022 at 8:09 PM     (0) understands/communicates without difficulty

## 2022-07-05 NOTE — ED ADULT TRIAGE NOTE - NS ED NURSE BANDS TYPE
[facility] Acute Inpt Rehab Note     Referral received via stroke order set.  Please note this is a screening only, rehab admissions will not actively be evaluating this patient.  If felt patient is appropriate for our services once therapies begin, please call our office at [phone], to initiate a full referral.    Thank you,   Vanesa Wood RN   Rehab Admission Nurse    Name band;

## 2022-07-06 NOTE — H&P PST ADULT - BREASTS
Admission Reconciliation is Completed  Discharge Reconciliation is Not Complete Admission Reconciliation is Completed  Discharge Reconciliation is Completed not examined

## 2022-08-12 PROBLEM — E04.2 NONTOXIC MULTINODULAR GOITER: Chronic | Status: ACTIVE | Noted: 2019-04-10

## 2022-08-12 PROBLEM — M54.5 LOW BACK PAIN: Chronic | Status: ACTIVE | Noted: 2019-04-10

## 2022-08-12 PROBLEM — E66.9 OBESITY, UNSPECIFIED: Chronic | Status: ACTIVE | Noted: 2019-04-10

## 2022-08-12 PROBLEM — K44.9 DIAPHRAGMATIC HERNIA WITHOUT OBSTRUCTION OR GANGRENE: Chronic | Status: ACTIVE | Noted: 2019-04-10

## 2022-08-16 ENCOUNTER — APPOINTMENT (OUTPATIENT)
Dept: MAMMOGRAPHY | Facility: CLINIC | Age: 37
End: 2022-08-16

## 2022-08-16 ENCOUNTER — OUTPATIENT (OUTPATIENT)
Dept: OUTPATIENT SERVICES | Facility: HOSPITAL | Age: 37
LOS: 1 days | End: 2022-08-16
Payer: COMMERCIAL

## 2022-08-16 DIAGNOSIS — Z00.8 ENCOUNTER FOR OTHER GENERAL EXAMINATION: ICD-10-CM

## 2022-08-16 PROCEDURE — 77067 SCR MAMMO BI INCL CAD: CPT | Mod: 26

## 2022-08-16 PROCEDURE — 77063 BREAST TOMOSYNTHESIS BI: CPT | Mod: 26

## 2022-08-16 PROCEDURE — 77067 SCR MAMMO BI INCL CAD: CPT

## 2022-08-16 PROCEDURE — 77063 BREAST TOMOSYNTHESIS BI: CPT

## 2022-12-01 NOTE — DISCHARGE NOTE PROVIDER - NSDCACTIVITY_GEN_ALL_CORE
dyspnea
Showering allowed/Stairs allowed/Walking - Indoors allowed/No heavy lifting/straining/Walking - Outdoors allowed

## 2023-08-22 ENCOUNTER — APPOINTMENT (OUTPATIENT)
Age: 38
End: 2023-08-22
Payer: COMMERCIAL

## 2023-08-22 VITALS
OXYGEN SATURATION: 99 % | DIASTOLIC BLOOD PRESSURE: 75 MMHG | WEIGHT: 144 LBS | TEMPERATURE: 98.2 F | SYSTOLIC BLOOD PRESSURE: 115 MMHG | BODY MASS INDEX: 26.5 KG/M2 | HEIGHT: 62 IN | HEART RATE: 110 BPM

## 2023-08-22 DIAGNOSIS — E66.3 OVERWEIGHT: ICD-10-CM

## 2023-08-22 DIAGNOSIS — R53.82 CHRONIC FATIGUE, UNSPECIFIED: ICD-10-CM

## 2023-08-22 DIAGNOSIS — E55.9 VITAMIN D DEFICIENCY, UNSPECIFIED: ICD-10-CM

## 2023-08-22 DIAGNOSIS — E28.2 POLYCYSTIC OVARIAN SYNDROME: ICD-10-CM

## 2023-08-22 DIAGNOSIS — E04.1 NONTOXIC SINGLE THYROID NODULE: ICD-10-CM

## 2023-08-22 DIAGNOSIS — L65.9 NONSCARRING HAIR LOSS, UNSPECIFIED: ICD-10-CM

## 2023-08-22 PROCEDURE — 36415 COLL VENOUS BLD VENIPUNCTURE: CPT

## 2023-08-22 PROCEDURE — 99214 OFFICE O/P EST MOD 30 MIN: CPT | Mod: 25

## 2023-08-22 PROCEDURE — 76536 US EXAM OF HEAD AND NECK: CPT

## 2023-08-22 PROCEDURE — 99204 OFFICE O/P NEW MOD 45 MIN: CPT | Mod: 25

## 2023-08-22 NOTE — IMPRESSION
[FreeTextEntry1] : Heterogenous thyroid bilaterally with sub-centimeter nodularity as outlined above. The nodularity is non-high risk per Tirads criteria and thus no intervention is needed at this time. I have asked the patient to follow up in 9-12 months for thyroid reassessment and repeat thyroid ultrasound.

## 2023-08-22 NOTE — PROCEDURE
[[employer] e 2008 model, 10-12 MHz frequencies] : multiple real time longitudinal and transverse images were obtained using a high resolution ultrasound with a linear transducer, [employer] e 2008 model, 10-12 MHz frequencies. All measurements will be reported as longitudinal x oh-posterior x transverse. [Multinodular Goiter] : multinodular goiter [] : a heterogeneous parenchyma [No] : does not have a halo [No calcification] : no calcification [Peripheral and scant  internal vas] : peripheral and scant internal vascularity [Right Thyroid] : right [Upper] : upper pole there is a  [Hypoechoic] : hypoechoic nodule [Thin dirupted] : has a thin disrupted halo [Peripheral vascularity] : peripheral vascularity [Lower] : lower pole there is a  [Microcalcifications] : microcalcifications [Left Thyroid] : left [Mid] : mid pole there is a  [Isoechoic w/ hypoechoic foci] : isoechoic, with an internal hypoechoic foci nodule [Round] : round in shape [Regular] : regular [No calcifications] : no calcifications [Peripheral and scant  internal vascularity] : peripheral and scant internal vascularity [FreeTextEntry1] : 3.53 x 1.60 x 2.39 [FreeTextEntry5] : 2.72 x 1.27 x 2.68 [FreeTextEntry2] : 0.34 [FreeTextEntry3] : 0.49 x 0.49 x 0.72

## 2023-08-22 NOTE — PROCEDURE
[[employer] e 2008 model, 10-12 MHz frequencies] : multiple real time longitudinal and transverse images were obtained using a high resolution ultrasound with a linear transducer, [employer] e 2008 model, 10-12 MHz frequencies. All measurements will be reported as longitudinal x oh-posterior x transverse. [Multinodular Goiter] : multinodular goiter [] : a heterogeneous parenchyma [No] : does not have a halo [No calcification] : no calcification [Peripheral and scant  internal vas] : peripheral and scant internal vascularity [Right Thyroid] : right [Upper] : upper pole there is a  [Hypoechoic] : hypoechoic nodule [Thin dirupted] : has a thin disrupted halo [Peripheral vascularity] : peripheral vascularity [Lower] : lower pole there is a  [Microcalcifications] : microcalcifications [Left Thyroid] : left [Mid] : mid pole there is a  [Isoechoic w/ hypoechoic foci] : isoechoic, with an internal hypoechoic foci nodule [Round] : round in shape [Regular] : regular [No calcifications] : no calcifications [Peripheral and scant  internal vascularity] : peripheral and scant internal vascularity [FreeTextEntry1] : 3.53 x 1.60 x 2.39 [FreeTextEntry5] : 2.72 x 1.27 x 2.68 [FreeTextEntry2] : 0.34 [FreeTextEntry3] : 0.49 x 0.49 x 0.72

## 2023-08-23 LAB
25(OH)D3 SERPL-MCNC: 21.6 NG/ML
ALBUMIN SERPL ELPH-MCNC: 4.5 G/DL
ALP BLD-CCNC: 57 U/L
ALT SERPL-CCNC: 10 U/L
ANION GAP SERPL CALC-SCNC: 10 MMOL/L
AST SERPL-CCNC: 15 U/L
BILIRUB SERPL-MCNC: 0.5 MG/DL
BUN SERPL-MCNC: 13 MG/DL
CALCIUM SERPL-MCNC: 9.5 MG/DL
CHLORIDE SERPL-SCNC: 106 MMOL/L
CHOLEST SERPL-MCNC: 187 MG/DL
CO2 SERPL-SCNC: 24 MMOL/L
CORTIS SERPL-MCNC: 4.1 UG/DL
CORTIS SERPL-MCNC: 4.3 UG/DL
CREAT SERPL-MCNC: 0.73 MG/DL
DHEA-S SERPL-MCNC: 120 UG/DL
EGFR: 108 ML/MIN/1.73M2
ESTIMATED AVERAGE GLUCOSE: 100 MG/DL
ESTRADIOL SERPL-MCNC: 156 PG/ML
FERRITIN SERPL-MCNC: 11 NG/ML
FOLATE SERPL-MCNC: 7.9 NG/ML
FSH SERPL-MCNC: 2.1 IU/L
GLUCOSE SERPL-MCNC: 84 MG/DL
HBA1C MFR BLD HPLC: 5.1 %
HDLC SERPL-MCNC: 85 MG/DL
INSULIN SERPL-MCNC: 14.3 UU/ML
LDLC SERPL CALC-MCNC: 87 MG/DL
LH SERPL-ACNC: 4.6 IU/L
MAGNESIUM SERPL-MCNC: 2.4 MG/DL
NONHDLC SERPL-MCNC: 102 MG/DL
POTASSIUM SERPL-SCNC: 4.8 MMOL/L
PROLACTIN SERPL-MCNC: 11.5 NG/ML
PROT SERPL-MCNC: 7 G/DL
SHBG SERPL-SCNC: 55.4 NMOL/L
SODIUM SERPL-SCNC: 139 MMOL/L
T3FREE SERPL-MCNC: 2.68 PG/ML
T4 FREE SERPL-MCNC: 0.9 NG/DL
TRIGL SERPL-MCNC: 80 MG/DL
TSH SERPL-ACNC: 0.51 UIU/ML
VIT B12 SERPL-MCNC: 713 PG/ML

## 2023-08-24 LAB
TESTOST FREE SERPL-MCNC: 0.3 PG/ML
TESTOST SERPL-MCNC: 12.1 NG/DL
THYROGLOB AB SERPL-ACNC: <20 IU/ML
THYROPEROXIDASE AB SERPL IA-ACNC: <10 IU/ML

## 2023-08-26 VITALS
HEART RATE: 110 BPM | RESPIRATION RATE: 16 BRPM | DIASTOLIC BLOOD PRESSURE: 75 MMHG | WEIGHT: 144 LBS | BODY MASS INDEX: 26.5 KG/M2 | OXYGEN SATURATION: 99 % | HEIGHT: 62 IN | TEMPERATURE: 98.2 F | SYSTOLIC BLOOD PRESSURE: 115 MMHG

## 2023-08-26 PROBLEM — L65.9 HAIR LOSS: Status: ACTIVE | Noted: 2023-08-22

## 2023-08-26 PROBLEM — E04.1 NODULAR THYROID DISEASE: Status: ACTIVE | Noted: 2019-07-15

## 2023-08-26 PROBLEM — E28.2 PCOS (POLYCYSTIC OVARIAN SYNDROME): Status: ACTIVE | Noted: 2018-11-16

## 2023-08-26 PROBLEM — E55.9 VITAMIN D DEFICIENCY: Status: ACTIVE | Noted: 2020-06-19

## 2023-08-26 PROBLEM — E66.3 OVERWEIGHT: Status: ACTIVE | Noted: 2023-08-26

## 2023-08-26 NOTE — HISTORY OF PRESENT ILLNESS
[FreeTextEntry1] : Ms. BRAVO  is a 33 year year old female who returns today for endocrine reevaluation.  Patient returns with regard to  a history of  pcos.  Additional history includes that of  thyroid nodularity, large-had fna in past at [facility].  Does note then when she extends her neck, she feels some discomfort. Had fna 6/11/2015 at [facility] FNA done on a left midpole 1.4 cm nodule with  results c/w a benign nodualr goiter. Did have gastric sleeve procedure with Dr. Hernandez on 4/17 of this year and has lost 50 lbs to date.  The pcos did manifest in the past as irreg cycles but did change to every 5-6 weeks-then became preg now ahs 2 yr old girl. had been on metfromin  leading up to the preg. Followed by Dr. Gilberto Salvador.  Never went back on metformin psot preg. In past did have hair growth-wtih years of laser hiar removal.  Had midl acne in pst now better and had thinning hair. menses since sleeve procedure more regular. Is on bioyin and a multivit. hair loss did worsen post  surg Aunts/uncles with type 2 but neither parent with dm. PMD  Dr Cox in [place]  She feels thyroid is more prominent of late.

## 2023-08-26 NOTE — HISTORY OF PRESENT ILLNESS
[FreeTextEntry1] : Ms. BRAVO  is a 33 year year old female who returns today for endocrine reevaluation.  Patient returns with regard to  a history of  pcos.  Additional history includes that of  thyroid nodularity, large-had fna in past at [facility].  Does note then when she extends her neck, she feels some discomfort. Had fna 6/11/2015 at [facility] FNA done on a left midpole 1.4 cm nodule with  results c/w a benign nodualr goiter. Did have gastric sleeve procedure with Dr. Hernandez on 4/17 of this year and has lost 50 lbs to date.  The pcos did manifest in the past as irreg cycles but did change to every 5-6 weeks-then became preg now ahs 2 yr old girl. had been on metfromin  leading up to the preg. Followed by Dr. Gilberto Salvador.  Never went back on metformin psot preg. In past did have hair growth-wtih years of laser hiar removal.  Had midl acne in pst now better and had thinning hair. menses since sleeve procedure more regular. Is on bioyin and a multivit. hair loss did worsen post  surg Aunts/uncles with type 2 but neither parent with dm. PMD  Dr Cox in [place]  She feels thyroid is more prominent of late.

## 2023-08-26 NOTE — HISTORY OF PRESENT ILLNESS
[FreeTextEntry1] : Ms. BRAVO  is a 33 year year old female who returns today for endocrine reevaluation.  Patient returns with regard to  a history of  pcos.  Additional history includes that of  thyroid nodularity, large-had fna in past at [facility].  Does note then when she extends her neck, she feels some discomfort. Had fna 6/11/2015 at [facility] FNA done on a left midpole 1.4 cm nodule with  results c/w a benign nodualr goiter. Did have gastric sleeve procedure with Dr. Hernandez on 4/17 of this year and has lost 50 lbs to date.  The pcos did manifest in the past as irreg cycles but did change to every 5-6 weeks-then became preg now ahs 2 yr old girl. had been on metfromin  leading up to the preg. Followed by Dr. Gilberto Salvador.  Never went back on metformin psot preg. In past did have hair growth-wtih years of laser hiar removal.  Had midl acne in pst now better and had thinning hair. menses since sleeve procedure more regular. Is on bioyin and a multivit. hair loss did worsen post  surg Aunts/uncles with type 2 but neither parent with dm. PMD  Dr Cox in [place]  She feels thyroid is more prominent of late.

## 2023-08-28 LAB
UBIQUINONE10 SERPL-MCNC: 0.69 UG/ML
VIT B1 SERPL-MCNC: 105.5 NMOL/L
VIT B2 SERPL-MCNC: 236 UG/L

## 2023-08-29 LAB
NICOTINAMIDE: 32.6 NG/ML
NICOTINIC ACID: <5 NG/ML

## 2023-08-30 LAB
CORTICOSTEROID BIND GLOBULIN: 2.8 MG/DL
CORTIS SERPL-MCNC: 4.5 UG/DL
CORTISOL, FREE: 0.11 UG/DL
PFCX: 2.4 %

## 2023-09-01 RX ORDER — ERGOCALCIFEROL 1.25 MG/1
1.25 MG CAPSULE ORAL
Qty: 12 | Refills: 1 | Status: ACTIVE | COMMUNITY
Start: 2023-08-23 | End: 1900-01-01

## 2023-10-09 ENCOUNTER — APPOINTMENT (OUTPATIENT)
Age: 38
End: 2023-10-09
Payer: COMMERCIAL

## 2023-10-09 PROCEDURE — 76830 TRANSVAGINAL US NON-OB: CPT

## 2023-10-09 PROCEDURE — 99395 PREV VISIT EST AGE 18-39: CPT | Mod: 25

## 2023-10-09 PROCEDURE — 81002 URINALYSIS NONAUTO W/O SCOPE: CPT

## 2023-12-25 ENCOUNTER — NON-APPOINTMENT (OUTPATIENT)
Age: 38
End: 2023-12-25

## 2024-02-20 ENCOUNTER — APPOINTMENT (OUTPATIENT)
Age: 39
End: 2024-02-20
Payer: COMMERCIAL

## 2024-02-20 PROCEDURE — 99213 OFFICE O/P EST LOW 20 MIN: CPT | Mod: 25

## 2024-02-20 PROCEDURE — 56820 COLPOSCOPY VULVA: CPT

## 2025-08-04 ENCOUNTER — APPOINTMENT (OUTPATIENT)
Age: 40
End: 2025-08-04
Payer: COMMERCIAL

## 2025-08-04 PROCEDURE — 99396 PREV VISIT EST AGE 40-64: CPT

## 2025-08-04 PROCEDURE — 81002 URINALYSIS NONAUTO W/O SCOPE: CPT

## 2025-08-04 PROCEDURE — 99459 PELVIC EXAMINATION: CPT | Mod: NC

## 2025-08-04 PROCEDURE — 76830 TRANSVAGINAL US NON-OB: CPT
